# Patient Record
Sex: FEMALE | Race: WHITE | NOT HISPANIC OR LATINO | Employment: FULL TIME | URBAN - METROPOLITAN AREA
[De-identification: names, ages, dates, MRNs, and addresses within clinical notes are randomized per-mention and may not be internally consistent; named-entity substitution may affect disease eponyms.]

---

## 2017-01-05 ENCOUNTER — OFFICE VISIT (OUTPATIENT)
Dept: FAMILY MEDICINE CLINIC | Facility: CLINIC | Age: 52
End: 2017-01-05

## 2017-01-05 VITALS
BODY MASS INDEX: 23.03 KG/M2 | HEIGHT: 72 IN | RESPIRATION RATE: 16 BRPM | WEIGHT: 170 LBS | SYSTOLIC BLOOD PRESSURE: 106 MMHG | HEART RATE: 80 BPM | DIASTOLIC BLOOD PRESSURE: 80 MMHG

## 2017-01-05 DIAGNOSIS — M77.8 LEFT ELBOW TENDONITIS: Primary | ICD-10-CM

## 2017-01-05 DIAGNOSIS — M79.671 PAIN OF RIGHT HEEL: ICD-10-CM

## 2017-01-05 PROBLEM — I10 ESSENTIAL HYPERTENSION: Status: ACTIVE | Noted: 2017-01-05

## 2017-01-05 PROCEDURE — 73070 X-RAY EXAM OF ELBOW: CPT | Performed by: FAMILY MEDICINE

## 2017-01-05 PROCEDURE — 99214 OFFICE O/P EST MOD 30 MIN: CPT | Performed by: FAMILY MEDICINE

## 2017-01-05 PROCEDURE — 73650 X-RAY EXAM OF HEEL: CPT | Performed by: FAMILY MEDICINE

## 2017-01-05 NOTE — PATIENT INSTRUCTIONS
Exercise 30 minutes most days of the week  Sleep 6-8 hours each night if possible  Low fat, low cholesterol diet   we discussed prescribed medications and how to take them   make sure you get results of any labs/studies ordered today  Low glycemic index diet plantar stretching exercises,    Vivlodex 5mg q day  Sample to try  #3       Tennis elbow strap left elbow

## 2017-01-05 NOTE — MR AVS SNAPSHOT
Adriana Lynrien   2017 12:00 PM   Office Visit    Provider:  Isiah Pérez MD   Department:  [facility]   Dept Phone:  [phone]                Your Full Care Plan              Your Updated Medication List          This list is accurate as of: 17  5:24 PM.  Always use your most recent med list.                amLODIPine 5 MG tablet   Commonly known as:  NORVASC   TAKE ONE TABLET BY MOUTH ONCE DAILY               We Performed the Following     XR Calcaneus 2+ View Right (In Office)     XR Elbow 2 View Left (In Office)       You Were Diagnosed With        Codes Comments    Left elbow tendonitis    -  Primary ICD-10-CM: M77.8  ICD-9-CM: 727.09     Pain of right heel     ICD-10-CM: M79.671  ICD-9-CM: 729.5       Instructions    Exercise 30 minutes most days of the week  Sleep 6-8 hours each night if possible  Low fat, low cholesterol diet   we discussed prescribed medications and how to take them   make sure you get results of any labs/studies ordered today  Low glycemic index diet plantar stretching exercises,    Vivlodex 5mg q day  Sample to try  #3       Tennis elbow strap left elbow     Patient Instructions History      [facility] Signup     [facility] [employer] allows you to send messages to your doctor, view your test results, renew your prescriptions, schedule appointments, and more. To sign up, go to [employer] and click on the Sign Up Now link in the New User? box. Enter your [employer] Activation Code exactly as it appears below along with the last four digits of your Social Security Number and your Date of Birth () to complete the sign-up process. If you do not sign up before the expiration date, you must request a new code.    [employer] Activation Code: [email or web address]  Expires: 2017  5:24 PM    If you have questions, you can email [email or web address] or call [phone] to talk to our [employer] staff. Remember,  "MyChart is NOT to be used for urgent needs. For medical emergencies, dial 911.               Other Info from Your Visit           Allergies     Compazine [Prochlorperazine Edisylate] Unspecified       Reason for Visit     Elbow Pain     Foot Pain           Vital Signs     Blood Pressure Pulse Respirations Height Weight Body Mass Index    106/80 80 16 79\" (200.7 cm) 170 lb (77.1 kg) 19.15 kg/m2    Smoking Status                   Never Smoker           Problems and Diagnoses Noted     High blood pressure    Left elbow tendonitis    -  Primary    Pain of right heel          "

## 2017-01-05 NOTE — PROGRESS NOTES
Subjective   Adriana Mcginnis is a 51 y.o. female.     History of Present Illness   Chief Complaint:   Chief Complaint   Patient presents with   • Elbow Pain   • Foot Pain       Adriana Mcginnis 51 y.o. female who presents today for left elbow pain for 6 mos. She has been using a tennis elbow strap and it is not helping. She is also having right heel for 4 mos.   she has a history of   No trauma. From elbow and no swelling. Probable plantar fascitis right heel There is no problem list on file for this patient.  .  Since the last visit, she has overall felt well.  she has been compliant with   Current Outpatient Prescriptions:   •  amLODIPine (NORVASC) 5 MG tablet, TAKE ONE TABLET BY MOUTH ONCE DAILY, Disp: 90 tablet, Rfl: 3.  she denies medication side effects.    All of the chronic condition(s) listed above are stable w/o issues.    There were no vitals taken for this visit.        The following portions of the patient's history were reviewed and updated as appropriate: allergies, current medications, past family history, past medical history, past social history, past surgical history and problem list.    Review of Systems   Constitutional: Negative for activity change, appetite change and unexpected weight change.   HENT: Negative for nosebleeds and trouble swallowing.    Eyes: Negative for pain and visual disturbance.   Respiratory: Negative for chest tightness, shortness of breath and wheezing.    Cardiovascular: Negative for chest pain and palpitations.   Gastrointestinal: Negative for abdominal pain and blood in stool.   Endocrine: Negative.    Genitourinary: Negative for difficulty urinating and hematuria.   Musculoskeletal: Negative for joint swelling.        Elbow pain  Heel pain   Skin: Negative for color change and rash.   Allergic/Immunologic: Negative.    Neurological: Negative for syncope and speech difficulty.   Hematological: Negative for adenopathy.   Psychiatric/Behavioral: Negative for agitation  and confusion.   All other systems reviewed and are negative.      Objective   Physical Exam   Constitutional: She is oriented to person, place, and time. She appears well-developed and well-nourished.   HENT:   Head: Normocephalic.   Mouth/Throat: Oropharynx is clear and moist.   Eyes: Pupils are equal, round, and reactive to light.   Neck: Normal range of motion. Neck supple. No thyromegaly present.   Cardiovascular: Normal rate and regular rhythm.    Pulmonary/Chest: Effort normal and breath sounds normal.   Abdominal: Soft.   Musculoskeletal: Normal range of motion.   Tennis elbow left      Tender right heel  Pain on walking    Plantar fascitis  Right heel   Neurological: She is alert and oriented to person, place, and time.   Skin: No rash noted.   Psychiatric: She has a normal mood and affect. Her behavior is normal.   Nursing note and vitals reviewed.  Views: PA/Lateral  Views: PA/Lateral    Relevant Clinical Issues/Diagnoses/Indications for XRay: see HPI  Clinical Findings: Extremities: Foot: Negative    Compared with previous XRay? not applicable    Date of Previous Xrana    Changes on current Xray? no  Relevant Clinical Issues/Diagnoses/Indications for XRay: see HPI  Clinical Findings: Extremities: Elbow: neg    Compared with previous XRay? no    Date of Previous Xray:n/a  Changes on current Xray? no    Assessment/Plan   Adriana was seen today for elbow pain and foot pain.    Diagnoses and all orders for this visit:    Left elbow tendonitis  -     XR Elbow 2 View Left (In Office)    Pain of right heel  -     XR Calcaneus 2+ View Right (In Office)

## 2017-01-26 ENCOUNTER — OFFICE VISIT (OUTPATIENT)
Dept: FAMILY MEDICINE CLINIC | Facility: CLINIC | Age: 52
End: 2017-01-26

## 2017-01-26 VITALS
WEIGHT: 170 LBS | BODY MASS INDEX: 23.03 KG/M2 | HEART RATE: 70 BPM | HEIGHT: 72 IN | TEMPERATURE: 98.6 F | RESPIRATION RATE: 16 BRPM | DIASTOLIC BLOOD PRESSURE: 72 MMHG | SYSTOLIC BLOOD PRESSURE: 110 MMHG

## 2017-01-26 DIAGNOSIS — E21.3 HYPERPARATHYROIDISM (HCC): ICD-10-CM

## 2017-01-26 DIAGNOSIS — Z12.11 ENCOUNTER FOR SCREENING COLONOSCOPY: ICD-10-CM

## 2017-01-26 DIAGNOSIS — R79.89 ELEVATED LFTS: Primary | ICD-10-CM

## 2017-01-26 DIAGNOSIS — I10 ESSENTIAL HYPERTENSION: ICD-10-CM

## 2017-01-26 DIAGNOSIS — M85.80 OSTEOPENIA: ICD-10-CM

## 2017-01-26 PROCEDURE — 77080 DXA BONE DENSITY AXIAL: CPT | Performed by: FAMILY MEDICINE

## 2017-01-26 PROCEDURE — 99214 OFFICE O/P EST MOD 30 MIN: CPT | Performed by: FAMILY MEDICINE

## 2017-01-26 RX ORDER — ALENDRONATE SODIUM 70 MG/1
70 TABLET ORAL
Qty: 12 TABLET | Refills: 3
Start: 2017-01-26 | End: 2021-12-08 | Stop reason: SDDI

## 2017-01-26 NOTE — PROGRESS NOTES
Subjective   Adriana Mcginnis is a 51 y.o. female.     History of Present Illness   Chief Complaint:   Chief Complaint   Patient presents with   • Elevated liver Function       Adriana Mcginnis 51 y.o. female who presents today for elevated livedr function tests. I discussed this with Dr. Foreman and he stated that she should have an US of her liver and a possible referral to Dr. Amaya. .  she has a history of   Patient Active Problem List   Diagnosis   • Essential hypertension   .  Since the last visit, she has overall felt well.  she has been compliant with   Current Outpatient Prescriptions:   •  amLODIPine (NORVASC) 5 MG tablet, TAKE ONE TABLET BY MOUTH ONCE DAILY, Disp: 90 tablet, Rfl: 3.  she denies medication side effects.    All of the chronic condition(s) listed above are stable w/o issues.    There were no vitals taken for this visit.    Results for orders placed or performed in visit on 12/02/16   Hepatic Function Panel   Result Value Ref Range    Total Protein 7.1 6.0 - 8.5 g/dL    Albumin 4.70 3.50 - 5.20 g/dL    Total Bilirubin 0.6 0.1 - 1.2 mg/dL    Bilirubin, Direct <0.2 0.0 - 0.3 mg/dL    Alkaline Phosphatase 69 39 - 117 U/L    AST (SGOT) 42 (H) 1 - 32 U/L    ALT (SGPT) 60 (H) 1 - 33 U/L         The following portions of the patient's history were reviewed and updated as appropriate: allergies, current medications, past family history, past medical history, past social history, past surgical history and problem list.    Review of Systems   Constitutional: Negative for activity change, appetite change and unexpected weight change.   Eyes: Negative for visual disturbance.   Respiratory: Negative for chest tightness and shortness of breath.    Cardiovascular: Negative for chest pain and palpitations.   Neurological: Negative for syncope and speech difficulty.   Psychiatric/Behavioral: Negative for confusion and decreased concentration.       Objective   Physical Exam   Constitutional: She is oriented  to person, place, and time. She appears well-developed and well-nourished.   HENT:   Head: Normocephalic and atraumatic.   Eyes: EOM are normal. Pupils are equal, round, and reactive to light.   Neck: Normal range of motion. Neck supple.   Cardiovascular: Normal rate, regular rhythm, normal heart sounds and intact distal pulses.    Pulmonary/Chest: Effort normal.   Abdominal: Soft.   Neurological: She is alert and oriented to person, place, and time.   Skin: Skin is warm and dry.   Nursing note and vitals reviewed.      Assessment/Plan   Adriana was seen today for elevated liver function.    Diagnoses and all orders for this visit:    Elevated LFTs  -     US Liver; Future  -     Ambulatory Referral to Gastroenterology    Osteopenia  -     DEXA Bone Density Axial    Hyperparathyroidism  -     DEXA Bone Density Axial    Essential hypertension    Encounter for screening colonoscopy  -     Ambulatory Referral to Gastroenterology    Other orders  -     alendronate (FOSAMAX) 70 MG tablet; Take 1 tablet by mouth Every 7 (Seven) Days.

## 2017-01-26 NOTE — PATIENT INSTRUCTIONS
Exercise 30 minutes most days of the week  Sleep 6-8 hours each night if possible  Low fat, low cholesterol diet   we discussed prescribed medications and how to take them   make sure you get results of any labs/studies ordered today  Low glycemic index diet  FAX BONE DENS TO DR GUILLORY

## 2017-01-26 NOTE — MR AVS SNAPSHOT
Adriana Rahel   1/26/2017 11:45 AM   Office Visit    Provider:  Isiah Pérez MD   Department:  [facility]   Dept Phone:  [phone]                Your Full Care Plan              Today's Medication Changes          These changes are accurate as of: 1/26/17  5:25 PM.  If you have any questions, ask your nurse or doctor.               New Medication(s)Ordered:     alendronate 70 MG tablet   Commonly known as:  FOSAMAX   Take 1 tablet by mouth Every 7 (Seven) Days.            Where to Get Your Medications      Information about where to get these medications is not yet available     ! Ask your nurse or doctor about these medications     alendronate 70 MG tablet                  Your Updated Medication List          This list is accurate as of: 1/26/17  5:25 PM.  Always use your most recent med list.                alendronate 70 MG tablet   Commonly known as:  FOSAMAX   Take 1 tablet by mouth Every 7 (Seven) Days.       amLODIPine 5 MG tablet   Commonly known as:  NORVASC   TAKE ONE TABLET BY MOUTH ONCE DAILY               We Performed the Following     Ambulatory Referral to Gastroenterology     DEXA Bone Density Axial       You Were Diagnosed With        Codes Comments    Elevated LFTs    -  Primary ICD-10-CM: R79.89  ICD-9-CM: 790.6     Osteopenia     ICD-10-CM: M85.80  ICD-9-CM: 733.90     Hyperparathyroidism     ICD-10-CM: E21.3  ICD-9-CM: 252.00     Essential hypertension     ICD-10-CM: I10  ICD-9-CM: 401.9     Encounter for screening colonoscopy     ICD-10-CM: Z12.11  ICD-9-CM: V76.51       Instructions    Exercise 30 minutes most days of the week  Sleep 6-8 hours each night if possible  Low fat, low cholesterol diet   we discussed prescribed medications and how to take them   make sure you get results of any labs/studies ordered today  Low glycemic index diet  FAX BONE DENS TO DR GUILLORY         Patient Instructions History      MyChart Signup     "   [employer] allows you to send messages to your doctor, view your test results, renew your prescriptions, schedule appointments, and more. To sign up, go to [employer] and click on the Sign Up Now link in the New User? box. Enter your Reissued Activation Code exactly as it appears below along with the last four digits of your Social Security Number and your Date of Birth () to complete the sign-up process. If you do not sign up before the expiration date, you must request a new code.    Reissued Activation Code: [email or web address]  Expires: 2017  5:25 PM    If you have questions, you can email [email or web address] or call [phone] to talk to our Reissued staff. Remember, Reissued is NOT to be used for urgent needs. For medical emergencies, dial 911.               Other Info from Your Visit           Allergies     Compazine [Prochlorperazine Edisylate] Unspecified       Reason for Visit     Elevated liver Function           Vital Signs     Blood Pressure Pulse Temperature Respirations Height Weight    110/72 70 98.6 °F (37 °C) 16 79\" (200.7 cm) 170 lb (77.1 kg)    Body Mass Index Smoking Status                19.15 kg/m2 Never Smoker          Problems and Diagnoses Noted     High blood pressure    Elevated LFTs    -  Primary    Bone disease        Parathyroid hormone excess        Screen for colon cancer          Results     DEXA Bone Density Axial             "

## 2017-02-13 ENCOUNTER — HOSPITAL ENCOUNTER (OUTPATIENT)
Dept: ULTRASOUND IMAGING | Facility: HOSPITAL | Age: 52
Discharge: HOME | End: 2017-02-13
Admitting: FAMILY MEDICINE

## 2017-02-13 DIAGNOSIS — R79.89 ELEVATED LFTS: ICD-10-CM

## 2017-02-13 PROCEDURE — 76705 ECHO EXAM OF ABDOMEN: CPT

## 2017-03-22 ENCOUNTER — OFFICE VISIT (OUTPATIENT)
Dept: GASTROENTEROLOGY | Facility: CLINIC | Age: 52
End: 2017-03-22

## 2017-03-22 VITALS
HEIGHT: 70 IN | DIASTOLIC BLOOD PRESSURE: 82 MMHG | SYSTOLIC BLOOD PRESSURE: 110 MMHG | WEIGHT: 187.4 LBS | BODY MASS INDEX: 26.83 KG/M2

## 2017-03-22 DIAGNOSIS — R79.89 ELEVATED LFTS: Primary | ICD-10-CM

## 2017-03-22 DIAGNOSIS — Z12.11 ENCOUNTER FOR SCREENING FOR MALIGNANT NEOPLASM OF COLON: ICD-10-CM

## 2017-03-22 PROCEDURE — 99203 OFFICE O/P NEW LOW 30 MIN: CPT | Performed by: INTERNAL MEDICINE

## 2017-03-22 NOTE — PROGRESS NOTES
Chief Complaint   Patient presents with   • Elevated Hepatic Enzymes       Adriana Mcginnis is 50 yo for initial patient visit for elevated transaminases.  She has no personal history of liver disease.  These laboratory abnormalities were found incidentally during routine lab work done because she is on stelara.  These are not thought to be related related to stelara.  She's taking still there for psoriasis and has had good results.  She has no family history of liver disease.  She has no history of alcohol abuse.  She takes no over-the-counter medications or herbal supplements.  She denies any history of jaundice.  She has no prior history of IV needlestick or blood transfusion.  She has no family history of colon rectal cancer or polyps she's never had a screen colonoscopy.  She is no issues with her bowel habits or blood in her stool.    HPI  Past Medical History:   Diagnosis Date   • Hypertension    • Parathyroid adenoma     benign   • Psoriasis    • Thyroid nodule      Past Surgical History:   Procedure Laterality Date   • CARPAL TUNNEL RELEASE     •  SECTION     • PARATHYROIDECTOMY         Current Outpatient Prescriptions:   •  alendronate (FOSAMAX) 70 MG tablet, Take 1 tablet by mouth Every 7 (Seven) Days., Disp: 12 tablet, Rfl: 3  •  amLODIPine (NORVASC) 5 MG tablet, TAKE ONE TABLET BY MOUTH ONCE DAILY, Disp: 90 tablet, Rfl: 3  •  Ustekinumab (STELARA SC), Inject  under the skin., Disp: , Rfl:   PRN Meds:.  Allergies   Allergen Reactions   • Compazine [Prochlorperazine Edisylate]      Social History     Social History   • Marital status:      Spouse name: N/A   • Number of children: N/A   • Years of education: N/A     Occupational History   • Not on file.     Social History Main Topics   • Smoking status: Never Smoker   • Smokeless tobacco: Never Used   • Alcohol use Yes      Comment: occ   • Drug use: No   • Sexual activity: Defer     Other Topics Concern   • Not on file     Social History  Narrative     Family History   Problem Relation Age of Onset   • Hypertension Mother    • Heart disease Father    • Diabetes Father    • Thyroid cancer Brother      Review of Systems-no abd pain, psoriasis controlled with stelara  Vitals:    03/22/17 1308   BP: 110/82     Last Weight    03/22/17  1308   Weight: 187 lb 6.4 oz (85 kg)      Physical Exam   Constitutional: She appears well-developed and well-nourished.   HENT:   Head: Normocephalic and atraumatic.   Eyes: No scleral icterus.   Abdominal: Soft. Bowel sounds are normal. She exhibits no distension. There is no tenderness.   Neurological: She is alert.   Skin: Skin is warm and dry.   Psychiatric: She has a normal mood and affect.     1. Elevated lfts  2. Screening for CRC    PLan:  - serologic w/u for elevated LFTs.  Her liver enzymes are only mildly elevated this time.  We will continue to follow and if they're persistent or worsening we do not find any obvious explanation on her lab work we may consider liver biopsy down the road but for now they're very mild.  - We'll schedule screening colonoscopy at her convenience.

## 2017-03-23 LAB
A1AT SERPL-MCNC: 145 MG/DL (ref 90–200)
ACTIN IGG SERPL-ACNC: 15 UNITS (ref 0–19)
ALBUMIN SERPL-MCNC: 4.6 G/DL (ref 3.5–5.2)
ALBUMIN/GLOB SERPL: 1.9 G/DL
ALP SERPL-CCNC: 65 U/L (ref 39–117)
ALT SERPL-CCNC: 51 U/L (ref 1–33)
ANA SER QL: NEGATIVE
AST SERPL-CCNC: 41 U/L (ref 1–32)
BILIRUB SERPL-MCNC: 0.6 MG/DL (ref 0.1–1.2)
BUN SERPL-MCNC: 11 MG/DL (ref 6–20)
BUN/CREAT SERPL: 13.4 (ref 7–25)
CALCIUM SERPL-MCNC: 10 MG/DL (ref 8.6–10.5)
CERULOPLASMIN SERPL-MCNC: 28.8 MG/DL (ref 19–39)
CHLORIDE SERPL-SCNC: 99 MMOL/L (ref 98–107)
CO2 SERPL-SCNC: 27.1 MMOL/L (ref 22–29)
CREAT SERPL-MCNC: 0.82 MG/DL (ref 0.57–1)
FERRITIN SERPL-MCNC: 42.83 NG/ML (ref 13–150)
GLOBULIN SER CALC-MCNC: 2.4 GM/DL
GLUCOSE SERPL-MCNC: 80 MG/DL (ref 65–99)
HAV AB SER QL IA: NEGATIVE
HAV IGM SERPL QL IA: NEGATIVE
HBV CORE IGM SERPL QL IA: NEGATIVE
HBV SURFACE AG SERPL QL IA: NEGATIVE
HCV AB S/CO SERPL IA: <0.1 S/CO RATIO (ref 0–0.9)
IGG SERPL-MCNC: 811 MG/DL (ref 700–1600)
IRON SATN MFR SERPL: 25 % (ref 20–50)
IRON SERPL-MCNC: 97 MCG/DL (ref 37–145)
MITOCHONDRIA M2 IGG SER-ACNC: <20 UNITS (ref 0–20)
POTASSIUM SERPL-SCNC: 4.3 MMOL/L (ref 3.5–5.2)
PROT SERPL-MCNC: 7 G/DL (ref 6–8.5)
SODIUM SERPL-SCNC: 141 MMOL/L (ref 136–145)
TIBC SERPL-MCNC: 382 MCG/DL
UIBC SERPL-MCNC: 285 MCG/DL

## 2017-03-30 ENCOUNTER — TELEPHONE (OUTPATIENT)
Dept: GASTROENTEROLOGY | Facility: CLINIC | Age: 52
End: 2017-03-30

## 2017-03-30 NOTE — TELEPHONE ENCOUNTER
----- Message from Lillian Leal MD sent at 3/28/2017  5:25 PM EDT -----  LFT elevations are stable and all other blood work that I checked was normal.  Given that her abnormalities are mild I would recommend rechecking her LFTs in 3 months and just following it for now

## 2017-03-30 NOTE — TELEPHONE ENCOUNTER
Call to pt.  Advise per Dr Leal that LFT elevations are stable, and all other blood work was normal.  Given that her abnormalities are mild, Dr Leal recommends rechecking her LFTs in 3 mo's, and just following it for now.    Pt verb understanding.  Lab appt scheduled for 6/7/17 @ 0800.

## 2017-04-02 ENCOUNTER — RESULTS ENCOUNTER (OUTPATIENT)
Dept: GASTROENTEROLOGY | Facility: CLINIC | Age: 52
End: 2017-04-02

## 2017-04-02 DIAGNOSIS — R79.89 ELEVATED LFTS: ICD-10-CM

## 2017-05-16 ENCOUNTER — ANESTHESIA EVENT (OUTPATIENT)
Dept: GASTROENTEROLOGY | Facility: HOSPITAL | Age: 52
End: 2017-05-16

## 2017-05-16 ENCOUNTER — HOSPITAL ENCOUNTER (OUTPATIENT)
Facility: HOSPITAL | Age: 52
Setting detail: HOSPITAL OUTPATIENT SURGERY
Discharge: HOME | End: 2017-05-16
Attending: INTERNAL MEDICINE | Admitting: INTERNAL MEDICINE

## 2017-05-16 ENCOUNTER — ANESTHESIA (OUTPATIENT)
Dept: GASTROENTEROLOGY | Facility: HOSPITAL | Age: 52
End: 2017-05-16

## 2017-05-16 VITALS
BODY MASS INDEX: 24.34 KG/M2 | HEIGHT: 70 IN | HEART RATE: 66 BPM | WEIGHT: 170 LBS | OXYGEN SATURATION: 100 % | SYSTOLIC BLOOD PRESSURE: 117 MMHG | RESPIRATION RATE: 16 BRPM | DIASTOLIC BLOOD PRESSURE: 85 MMHG | TEMPERATURE: 97.8 F

## 2017-05-16 DIAGNOSIS — Z12.11 ENCOUNTER FOR SCREENING FOR MALIGNANT NEOPLASM OF COLON: ICD-10-CM

## 2017-05-16 LAB
B-HCG UR QL: NEGATIVE
INTERNAL NEGATIVE CONTROL: NEGATIVE
INTERNAL POSITIVE CONTROL: POSITIVE
Lab: NORMAL

## 2017-05-16 PROCEDURE — 88305 TISSUE EXAM BY PATHOLOGIST: CPT | Performed by: INTERNAL MEDICINE

## 2017-05-16 PROCEDURE — S0260 H&P FOR SURGERY: HCPCS | Performed by: INTERNAL MEDICINE

## 2017-05-16 PROCEDURE — 45380 COLONOSCOPY AND BIOPSY: CPT | Performed by: INTERNAL MEDICINE

## 2017-05-16 PROCEDURE — 25010000002 PROPOFOL 10 MG/ML EMULSION: Performed by: ANESTHESIOLOGY

## 2017-05-16 RX ORDER — PROPOFOL 10 MG/ML
VIAL (ML) INTRAVENOUS AS NEEDED
Status: DISCONTINUED | OUTPATIENT
Start: 2017-05-16 | End: 2017-05-16 | Stop reason: SURG

## 2017-05-16 RX ORDER — SODIUM CHLORIDE, SODIUM LACTATE, POTASSIUM CHLORIDE, CALCIUM CHLORIDE 600; 310; 30; 20 MG/100ML; MG/100ML; MG/100ML; MG/100ML
30 INJECTION, SOLUTION INTRAVENOUS CONTINUOUS PRN
Status: DISCONTINUED | OUTPATIENT
Start: 2017-05-16 | End: 2017-05-16 | Stop reason: HOSPADM

## 2017-05-16 RX ORDER — PROPOFOL 10 MG/ML
VIAL (ML) INTRAVENOUS CONTINUOUS PRN
Status: DISCONTINUED | OUTPATIENT
Start: 2017-05-16 | End: 2017-05-16 | Stop reason: SURG

## 2017-05-16 RX ORDER — LIDOCAINE HYDROCHLORIDE 20 MG/ML
INJECTION, SOLUTION INFILTRATION; PERINEURAL AS NEEDED
Status: DISCONTINUED | OUTPATIENT
Start: 2017-05-16 | End: 2017-05-16 | Stop reason: SURG

## 2017-05-16 RX ADMIN — PROPOFOL 140 MCG/KG/MIN: 10 INJECTION, EMULSION INTRAVENOUS at 11:02

## 2017-05-16 RX ADMIN — SODIUM CHLORIDE, POTASSIUM CHLORIDE, SODIUM LACTATE AND CALCIUM CHLORIDE 30 ML/HR: 600; 310; 30; 20 INJECTION, SOLUTION INTRAVENOUS at 10:29

## 2017-05-16 RX ADMIN — LIDOCAINE HYDROCHLORIDE 50 MG: 20 INJECTION, SOLUTION INFILTRATION; PERINEURAL at 11:02

## 2017-05-16 RX ADMIN — PROPOFOL 150 MG: 10 INJECTION, EMULSION INTRAVENOUS at 11:02

## 2017-05-17 LAB
CYTO UR: NORMAL
LAB AP CASE REPORT: NORMAL
Lab: NORMAL
PATH REPORT.FINAL DX SPEC: NORMAL
PATH REPORT.GROSS SPEC: NORMAL

## 2017-05-18 ENCOUNTER — TELEPHONE (OUTPATIENT)
Dept: GASTROENTEROLOGY | Facility: CLINIC | Age: 52
End: 2017-05-18

## 2017-05-26 ENCOUNTER — CLINICAL SUPPORT (OUTPATIENT)
Dept: FAMILY MEDICINE CLINIC | Facility: CLINIC | Age: 52
End: 2017-05-26

## 2017-05-26 DIAGNOSIS — Z23 IMMUNIZATION DUE: Primary | ICD-10-CM

## 2017-05-26 PROCEDURE — 90471 IMMUNIZATION ADMIN: CPT | Performed by: FAMILY MEDICINE

## 2017-05-26 PROCEDURE — 90715 TDAP VACCINE 7 YRS/> IM: CPT | Performed by: FAMILY MEDICINE

## 2017-06-07 LAB
ALBUMIN SERPL-MCNC: 4.1 G/DL (ref 3.5–5.2)
ALBUMIN/GLOB SERPL: 1.6 G/DL
ALP SERPL-CCNC: 55 U/L (ref 39–117)
ALT SERPL-CCNC: 43 U/L (ref 1–33)
AST SERPL-CCNC: 36 U/L (ref 1–32)
BILIRUB SERPL-MCNC: 0.6 MG/DL (ref 0.1–1.2)
BUN SERPL-MCNC: 13 MG/DL (ref 6–20)
BUN/CREAT SERPL: 14.6 (ref 7–25)
CALCIUM SERPL-MCNC: 10.1 MG/DL (ref 8.6–10.5)
CHLORIDE SERPL-SCNC: 104 MMOL/L (ref 98–107)
CO2 SERPL-SCNC: 26.4 MMOL/L (ref 22–29)
CREAT SERPL-MCNC: 0.89 MG/DL (ref 0.57–1)
GLOBULIN SER CALC-MCNC: 2.5 GM/DL
GLUCOSE SERPL-MCNC: 79 MG/DL (ref 65–99)
POTASSIUM SERPL-SCNC: 4.1 MMOL/L (ref 3.5–5.2)
PROT SERPL-MCNC: 6.6 G/DL (ref 6–8.5)
SODIUM SERPL-SCNC: 143 MMOL/L (ref 136–145)

## 2017-06-14 ENCOUNTER — TELEPHONE (OUTPATIENT)
Dept: GASTROENTEROLOGY | Facility: CLINIC | Age: 52
End: 2017-06-14

## 2017-06-14 NOTE — TELEPHONE ENCOUNTER
----- Message from Lillian Leal MD sent at 6/9/2017  2:29 PM EDT -----  Stable elevation in transaminases (a little better overall ) - recommend repeat labs in 6 months (CMP)

## 2017-06-19 NOTE — TELEPHONE ENCOUNTER
Call to pt.  Advise per Dr Leal that stable elevation in transaminases ( a little better overall) recommend repeat labs in 6 mo's (CMP).    Pt verb understanding and states WCB to make lab appt.

## 2017-11-21 ENCOUNTER — FLU SHOT (OUTPATIENT)
Dept: FAMILY MEDICINE CLINIC | Facility: CLINIC | Age: 52
End: 2017-11-21

## 2017-11-21 PROCEDURE — 90471 IMMUNIZATION ADMIN: CPT | Performed by: FAMILY MEDICINE

## 2017-11-21 PROCEDURE — 90686 IIV4 VACC NO PRSV 0.5 ML IM: CPT | Performed by: FAMILY MEDICINE

## 2018-03-07 RX ORDER — AMLODIPINE BESYLATE 5 MG/1
TABLET ORAL
Qty: 90 TABLET | Refills: 1 | OUTPATIENT
Start: 2018-03-07

## 2018-03-09 RX ORDER — AMLODIPINE BESYLATE 5 MG/1
TABLET ORAL
Qty: 90 TABLET | Refills: 1 | Status: SHIPPED | OUTPATIENT
Start: 2018-03-09 | End: 2018-09-13 | Stop reason: SINTOL

## 2018-03-12 RX ORDER — AMLODIPINE BESYLATE 5 MG/1
TABLET ORAL
Qty: 90 TABLET | Refills: 1 | OUTPATIENT
Start: 2018-03-12

## 2018-04-18 ENCOUNTER — CLINICAL SUPPORT (OUTPATIENT)
Dept: FAMILY MEDICINE CLINIC | Facility: CLINIC | Age: 53
End: 2018-04-18

## 2018-04-18 DIAGNOSIS — Z23 IMMUNIZATION DUE: Primary | ICD-10-CM

## 2018-04-18 PROCEDURE — 90471 IMMUNIZATION ADMIN: CPT | Performed by: FAMILY MEDICINE

## 2018-04-18 PROCEDURE — 90632 HEPA VACCINE ADULT IM: CPT | Performed by: FAMILY MEDICINE

## 2018-08-04 DIAGNOSIS — I10 ESSENTIAL HYPERTENSION: Primary | ICD-10-CM

## 2018-08-04 DIAGNOSIS — T50.905D ADVERSE EFFECT OF DRUG, SUBSEQUENT ENCOUNTER: ICD-10-CM

## 2018-08-04 DIAGNOSIS — R79.89 LOW VITAMIN D LEVEL: ICD-10-CM

## 2018-08-06 LAB
25(OH)D3+25(OH)D2 SERPL-MCNC: 34.3 NG/ML (ref 30–100)
ALBUMIN SERPL-MCNC: 4.5 G/DL (ref 3.5–5.5)
ALBUMIN/GLOB SERPL: 1.8 {RATIO} (ref 1.2–2.2)
ALP SERPL-CCNC: 51 IU/L (ref 39–117)
ALT SERPL-CCNC: 39 IU/L (ref 0–32)
APPEARANCE UR: CLEAR
AST SERPL-CCNC: 40 IU/L (ref 0–40)
BACTERIA #/AREA URNS HPF: NORMAL /[HPF]
BASOPHILS # BLD AUTO: 0.1 X10E3/UL (ref 0–0.2)
BASOPHILS NFR BLD AUTO: 1 %
BILIRUB SERPL-MCNC: 0.8 MG/DL (ref 0–1.2)
BILIRUB UR QL STRIP: NEGATIVE
BUN SERPL-MCNC: 14 MG/DL (ref 6–24)
BUN/CREAT SERPL: 18 (ref 9–23)
CALCIUM SERPL-MCNC: 9.6 MG/DL (ref 8.7–10.2)
CHLORIDE SERPL-SCNC: 101 MMOL/L (ref 96–106)
CHOLEST SERPL-MCNC: 183 MG/DL (ref 100–199)
CO2 SERPL-SCNC: 26 MMOL/L (ref 20–29)
COLOR UR: YELLOW
CREAT SERPL-MCNC: 0.77 MG/DL (ref 0.57–1)
EOSINOPHIL # BLD AUTO: 0.2 X10E3/UL (ref 0–0.4)
EOSINOPHIL NFR BLD AUTO: 3 %
EPI CELLS #/AREA URNS HPF: NORMAL /HPF
ERYTHROCYTE [DISTWIDTH] IN BLOOD BY AUTOMATED COUNT: 13.6 % (ref 12.3–15.4)
GLOBULIN SER CALC-MCNC: 2.5 G/DL (ref 1.5–4.5)
GLUCOSE SERPL-MCNC: 79 MG/DL (ref 65–99)
GLUCOSE UR QL: NEGATIVE
HCT VFR BLD AUTO: 44.8 % (ref 34–46.6)
HDLC SERPL-MCNC: 54 MG/DL
HGB BLD-MCNC: 15.3 G/DL (ref 11.1–15.9)
HGB UR QL STRIP: NEGATIVE
IMM GRANULOCYTES # BLD: 0 X10E3/UL (ref 0–0.1)
IMM GRANULOCYTES NFR BLD: 0 %
KETONES UR QL STRIP: NEGATIVE
LDLC SERPL CALC-MCNC: 103 MG/DL (ref 0–99)
LEUKOCYTE ESTERASE UR QL STRIP: NEGATIVE
LYMPHOCYTES # BLD AUTO: 2.2 X10E3/UL (ref 0.7–3.1)
LYMPHOCYTES NFR BLD AUTO: 31 %
MCH RBC QN AUTO: 31.3 PG (ref 26.6–33)
MCHC RBC AUTO-ENTMCNC: 34.2 G/DL (ref 31.5–35.7)
MCV RBC AUTO: 92 FL (ref 79–97)
MICRO URNS: (no result)
MICRO URNS: (no result)
MONOCYTES # BLD AUTO: 0.5 X10E3/UL (ref 0.1–0.9)
MONOCYTES NFR BLD AUTO: 6 %
MUCOUS THREADS URNS QL MICRO: PRESENT
NEUTROPHILS # BLD AUTO: 4.3 X10E3/UL (ref 1.4–7)
NEUTROPHILS NFR BLD AUTO: 59 %
NITRITE UR QL STRIP: NEGATIVE
PH UR STRIP: 6.5 [PH] (ref 5–7.5)
PLATELET # BLD AUTO: 219 X10E3/UL (ref 150–379)
POTASSIUM SERPL-SCNC: 4 MMOL/L (ref 3.5–5.2)
PROT SERPL-MCNC: 7 G/DL (ref 6–8.5)
PROT UR QL STRIP: NEGATIVE
RBC # BLD AUTO: 4.89 X10E6/UL (ref 3.77–5.28)
RBC #/AREA URNS HPF: NORMAL /HPF
SODIUM SERPL-SCNC: 142 MMOL/L (ref 134–144)
SP GR UR: 1.01 (ref 1–1.03)
TRIGL SERPL-MCNC: 130 MG/DL (ref 0–149)
TSH SERPL DL<=0.005 MIU/L-ACNC: 1.71 UIU/ML (ref 0.45–4.5)
UROBILINOGEN UR STRIP-MCNC: 0.2 MG/DL (ref 0.2–1)
VLDLC SERPL CALC-MCNC: 26 MG/DL (ref 5–40)
WBC # BLD AUTO: 7.2 X10E3/UL (ref 3.4–10.8)
WBC #/AREA URNS HPF: NORMAL /HPF

## 2018-08-27 RX ORDER — CLOBETASOL PROPIONATE 0.46 MG/ML
SOLUTION TOPICAL
Qty: 1 EACH | Refills: 1 | Status: SHIPPED | OUTPATIENT
Start: 2018-08-27

## 2018-09-13 ENCOUNTER — OFFICE VISIT (OUTPATIENT)
Dept: FAMILY MEDICINE CLINIC | Facility: CLINIC | Age: 53
End: 2018-09-13

## 2018-09-13 VITALS
OXYGEN SATURATION: 97 % | HEIGHT: 70 IN | DIASTOLIC BLOOD PRESSURE: 86 MMHG | TEMPERATURE: 97.9 F | SYSTOLIC BLOOD PRESSURE: 132 MMHG | HEART RATE: 60 BPM | WEIGHT: 172 LBS | BODY MASS INDEX: 24.62 KG/M2 | RESPIRATION RATE: 16 BRPM

## 2018-09-13 DIAGNOSIS — R05.8 COUGH SECONDARY TO ANGIOTENSIN CONVERTING ENZYME INHIBITOR (ACE-I): ICD-10-CM

## 2018-09-13 DIAGNOSIS — Z00.00 ANNUAL PHYSICAL EXAM: Primary | ICD-10-CM

## 2018-09-13 DIAGNOSIS — I10 ESSENTIAL HYPERTENSION: ICD-10-CM

## 2018-09-13 DIAGNOSIS — L40.9 PSORIASIS: ICD-10-CM

## 2018-09-13 DIAGNOSIS — D35.1 PARATHYROID ADENOMA: ICD-10-CM

## 2018-09-13 DIAGNOSIS — T46.4X5A COUGH SECONDARY TO ANGIOTENSIN CONVERTING ENZYME INHIBITOR (ACE-I): ICD-10-CM

## 2018-09-13 PROCEDURE — 93000 ELECTROCARDIOGRAM COMPLETE: CPT | Performed by: FAMILY MEDICINE

## 2018-09-13 PROCEDURE — 71046 X-RAY EXAM CHEST 2 VIEWS: CPT | Performed by: FAMILY MEDICINE

## 2018-09-13 PROCEDURE — 99396 PREV VISIT EST AGE 40-64: CPT | Performed by: FAMILY MEDICINE

## 2018-09-13 RX ORDER — LOSARTAN POTASSIUM 25 MG/1
25 TABLET ORAL DAILY
Qty: 30 TABLET | Refills: 0 | Status: SHIPPED | OUTPATIENT
Start: 2018-09-13 | End: 2018-10-15 | Stop reason: SDUPTHER

## 2018-09-13 NOTE — PATIENT INSTRUCTIONS
Exercise 30 minutes most days of the week  Sleep 6-8 hours each night if possible  Low fat, low cholesterol diet   we discussed prescribed medications and how to take them   make sure you get results of any labs/studies ordered today  Low glycemic index diet      will do bp check 1 week  Stop norvasc   Take cozaar 25mg starting Friday   See derm as planned

## 2018-09-13 NOTE — PROGRESS NOTES
Subjective   Chief Complaint:   Chief Complaint   Patient presents with   • Annual Exam         History of Present Illness is a 53-year-old white female who was sent by dermatology Dr. Foreman to have Norvasc discontinued and switched to another antihypertensive medication.  She  had a Ace induced cough with lisinopril.  I'm going to switch to Cozaar 25 mg 1 by mouth daily.   Labs were reviewed   CMP, CBC, lipid panel were normal.  Bone densitometer is due in January 2019 the previous one 2 years ago showed osteopenia.  Blood pressure 120/80.  She is now on prednisone 10 mg 2 daily per dermatologist.  He is also going to stop the Humira and switch to another drug.  Also was ordered some labs on her yesterday.  Her psoriasis is generalized over her  entire body.  Psoriasis is worse on feet and legs.     Adriana Mcginnis 53 y.o. female who presents today for Medical Management of the below listed issues and medication refills.  Will get her mammogram and bone densitometer in December 2018.  Is on Fosamax weekly and she uses steroid creams when necessary.  And she is on calcium and vitamin D.  On prednisone for 2 weeks per the dermatologist.       she has a problem list of   Patient Active Problem List   Diagnosis   • Essential hypertension   • Psoriasis   .  Since the last visit, she has overall felt well.  she has been compliant with   Current Outpatient Prescriptions:   •  Adalimumab (HUMIRA) 40 MG/0.8ML Pen-injector Kit, Inject 40 mg under the skin every other week as directed., Disp: 2 each, Rfl: 5  •  alendronate (FOSAMAX) 70 MG tablet, Take 1 tablet by mouth Every 7 (Seven) Days., Disp: 12 tablet, Rfl: 3  •  Calcium Citrate-Vitamin D (CALCIUM + D PO), Take 1 tablet by mouth Daily., Disp: , Rfl:   •  clobetasol (TEMOVATE) 0.05 % external solution, APPLY  SOLUTION TOPICALLY TO AFFECTED AREA ONCE DAILY AS NEEDED, Disp: 1 each, Rfl: 1  •  losartan (COZAAR) 25 MG tablet, Take 1 tablet by mouth Daily., Disp: 30  "tablet, Rfl: 0.  she denies medication side effects.    All of the chronic condition(s) listed above are stable w/o issues.    /86   Pulse 60   Temp 97.9 °F (36.6 °C)   Resp 16   Ht 177.8 cm (70\")   Wt 78 kg (172 lb)   SpO2 97%   BMI 24.68 kg/m²     Views: lateral and posterior-anterior  cxr    Relevant Clinical Issues/Diagnoses/Indications for XRay: see HPI  Clinical Findings: Chest: was negative for infiltrate, effusion, pneumothorax, or wide mediastinum    Compared with previous XRay? no    Date of Previous Xray:No previous xray available for comparison    Changes on current Xray? no    Results for orders placed or performed in visit on 08/04/18   Comprehensive metabolic panel   Result Value Ref Range    Glucose 79 65 - 99 mg/dL    BUN 14 6 - 24 mg/dL    Creatinine 0.77 0.57 - 1.00 mg/dL    eGFR Non African Am 89 >59 mL/min/1.73    eGFR African Am 103 >59 mL/min/1.73    BUN/Creatinine Ratio 18 9 - 23    Sodium 142 134 - 144 mmol/L    Potassium 4.0 3.5 - 5.2 mmol/L    Chloride 101 96 - 106 mmol/L    Total CO2 26 20 - 29 mmol/L    Calcium 9.6 8.7 - 10.2 mg/dL    Total Protein 7.0 6.0 - 8.5 g/dL    Albumin 4.5 3.5 - 5.5 g/dL    Globulin 2.5 1.5 - 4.5 g/dL    A/G Ratio 1.8 1.2 - 2.2    Total Bilirubin 0.8 0.0 - 1.2 mg/dL    Alkaline Phosphatase 51 39 - 117 IU/L    AST (SGOT) 40 0 - 40 IU/L    ALT (SGPT) 39 (H) 0 - 32 IU/L   Lipid panel   Result Value Ref Range    Total Cholesterol 183 100 - 199 mg/dL    Triglycerides 130 0 - 149 mg/dL    HDL Cholesterol 54 >39 mg/dL    VLDL Cholesterol 26 5 - 40 mg/dL    LDL Cholesterol  103 (H) 0 - 99 mg/dL   TSH   Result Value Ref Range    TSH 1.710 0.450 - 4.500 uIU/mL   Vitamin D 25 Hydroxy   Result Value Ref Range    25 Hydroxy, Vitamin D 34.3 30.0 - 100.0 ng/mL   Microscopic Examination   Result Value Ref Range    WBC, UA 0-5 0 - 5 /hpf    RBC, UA None seen 0 - 2 /hpf    Epithelial Cells (non renal) 0-10 0 - 10 /hpf    Mucus, UA Present Not Estab.    Bacteria, UA " None seen None seen/Few   CBC and Differential   Result Value Ref Range    WBC 7.2 3.4 - 10.8 x10E3/uL    RBC 4.89 3.77 - 5.28 x10E6/uL    Hemoglobin 15.3 11.1 - 15.9 g/dL    Hematocrit 44.8 34.0 - 46.6 %    MCV 92 79 - 97 fL    MCH 31.3 26.6 - 33.0 pg    MCHC 34.2 31.5 - 35.7 g/dL    RDW 13.6 12.3 - 15.4 %    Platelets 219 150 - 379 x10E3/uL    Neutrophil Rel % 59 Not Estab. %    Lymphocyte Rel % 31 Not Estab. %    Monocyte Rel % 6 Not Estab. %    Eosinophil Rel % 3 Not Estab. %    Basophil Rel % 1 Not Estab. %    Neutrophils Absolute 4.3 1.4 - 7.0 x10E3/uL    Lymphocytes Absolute 2.2 0.7 - 3.1 x10E3/uL    Monocytes Absolute 0.5 0.1 - 0.9 x10E3/uL    Eosinophils Absolute 0.2 0.0 - 0.4 x10E3/uL    Basophils Absolute 0.1 0.0 - 0.2 x10E3/uL    Immature Granulocyte Rel % 0 Not Estab. %    Immature Grans Absolute 0.0 0.0 - 0.1 x10E3/uL   Urinalysis With Microscopic - Urine, Clean Catch   Result Value Ref Range    Specific Gravity, UA 1.010 1.005 - 1.030    pH, UA 6.5 5.0 - 7.5    Color, UA Yellow Yellow    Appearance, UA Clear Clear    Leukocytes, UA Negative Negative    Protein Negative Negative/Trace    Glucose, UA Negative Negative    Ketones Negative Negative    Blood, UA Negative Negative    Bilirubin, UA Negative Negative    Urobilinogen, UA 0.2 0.2 - 1.0 mg/dL    Nitrite, UA Negative Negative    Microscopic Examination Comment     MICROSCOPIC EXAMINATION See below:              The following portions of the patient's history were reviewed and updated as appropriate: allergies, current medications, past family history, past medical history, past social history, past surgical history and problem list.    Review of Systems   Constitutional: Negative for chills, diaphoresis, fatigue, fever and unexpected weight change.   HENT: Negative for ear pain and sinus pressure.    Eyes: Negative for visual disturbance.   Respiratory: Negative for apnea, chest tightness and shortness of breath.    Cardiovascular: Negative for  chest pain.   Gastrointestinal: Negative for abdominal pain, blood in stool, nausea and vomiting.   Endocrine: Negative for polyuria.   Genitourinary: Negative for dysuria and frequency.   Musculoskeletal: Negative for arthralgias and joint swelling.   Skin: Positive for rash.   Neurological: Negative for dizziness, seizures and headaches.   Psychiatric/Behavioral: Positive for sleep disturbance. Negative for agitation and confusion. The patient is not nervous/anxious.        Objective   Physical Exam   Constitutional: She is oriented to person, place, and time. She appears well-developed and well-nourished.   HENT:   Right Ear: External ear normal.   Left Ear: External ear normal.   Mouth/Throat: Oropharynx is clear and moist.   Eyes: Pupils are equal, round, and reactive to light.   Neck: No thyromegaly present.   Cardiovascular: Normal rate, regular rhythm and normal heart sounds.    No murmur heard.  Pulmonary/Chest: Effort normal and breath sounds normal. No respiratory distress. She has no wheezes. She has no rales.   Abdominal: Soft. Bowel sounds are normal. There is no tenderness.   Musculoskeletal: She exhibits no edema, tenderness or deformity.   Lymphadenopathy:     She has no cervical adenopathy.   Neurological: She is alert and oriented to person, place, and time. She displays normal reflexes. No sensory deficit.   Skin: No rash noted.   Psychiatric: She has a normal mood and affect. Her behavior is normal. Judgment and thought content normal.   Nursing note and vitals reviewed.      Assessment/Plan   Adriana was seen today for annual exam.    Diagnoses and all orders for this visit:    Annual physical exam  -     ECG 12 Lead  -     XR Chest PA & Lateral (In Office)    Essential hypertension    Psoriasis  Comments:  rash worse    Parathyroid adenoma  Comments:  had surgery    Cough secondary to angiotensin converting enzyme inhibitor (ACE-I)    Other orders  -     losartan (COZAAR) 25 MG tablet; Take  1 tablet by mouth Daily.

## 2018-09-13 NOTE — PROGRESS NOTES
Procedure     ECG 12 Lead  Date/Time: 9/13/2018 11:27 AM  Performed by: MAGUE DELGADO  Authorized by: MAGUE DELGADO   Comparison: compared with previous ECG from 10/18/2015  Similar to previous ECG  Rhythm: sinus rhythm  Rate: normal  BPM: 71  Conduction: conduction normal  ST Segments: ST segments normal  T Waves: T waves normal  QRS axis: normal  Other: no other findings  Clinical impression: normal ECG

## 2018-10-15 RX ORDER — LOSARTAN POTASSIUM 25 MG/1
25 TABLET ORAL DAILY
Qty: 30 TABLET | Refills: 5 | Status: SHIPPED | OUTPATIENT
Start: 2018-10-15 | End: 2021-05-15

## 2018-10-19 ENCOUNTER — FLU SHOT (OUTPATIENT)
Dept: FAMILY MEDICINE CLINIC | Facility: CLINIC | Age: 53
End: 2018-10-19

## 2018-10-19 DIAGNOSIS — Z23 FLU VACCINE NEED: ICD-10-CM

## 2018-10-19 PROCEDURE — 90471 IMMUNIZATION ADMIN: CPT | Performed by: FAMILY MEDICINE

## 2018-10-19 PROCEDURE — 90674 CCIIV4 VAC NO PRSV 0.5 ML IM: CPT | Performed by: FAMILY MEDICINE

## 2018-11-21 ENCOUNTER — CLINICAL SUPPORT (OUTPATIENT)
Dept: FAMILY MEDICINE CLINIC | Facility: CLINIC | Age: 53
End: 2018-11-21

## 2018-11-21 DIAGNOSIS — Z23 IMMUNIZATION DUE: Primary | ICD-10-CM

## 2018-11-21 PROCEDURE — 90632 HEPA VACCINE ADULT IM: CPT | Performed by: FAMILY MEDICINE

## 2018-11-21 PROCEDURE — 90471 IMMUNIZATION ADMIN: CPT | Performed by: FAMILY MEDICINE

## 2018-12-18 DIAGNOSIS — M13.0 ARTHRITIS OF MULTIPLE SITES: Primary | ICD-10-CM

## 2018-12-20 ENCOUNTER — HOSPITAL ENCOUNTER (OUTPATIENT)
Dept: GENERAL RADIOLOGY | Facility: HOSPITAL | Age: 53
Discharge: HOME | End: 2018-12-20
Attending: INTERNAL MEDICINE

## 2018-12-20 ENCOUNTER — HOSPITAL ENCOUNTER (OUTPATIENT)
Dept: GENERAL RADIOLOGY | Facility: HOSPITAL | Age: 53
Discharge: HOME | End: 2018-12-20
Attending: INTERNAL MEDICINE | Admitting: INTERNAL MEDICINE

## 2018-12-20 DIAGNOSIS — M79.671 RIGHT FOOT PAIN: ICD-10-CM

## 2018-12-20 DIAGNOSIS — M54.2 CERVICALGIA: ICD-10-CM

## 2018-12-20 DIAGNOSIS — M79.641 RIGHT HAND PAIN: ICD-10-CM

## 2018-12-20 PROCEDURE — 72040 X-RAY EXAM NECK SPINE 2-3 VW: CPT

## 2018-12-20 PROCEDURE — 73630 X-RAY EXAM OF FOOT: CPT

## 2018-12-20 PROCEDURE — 73130 X-RAY EXAM OF HAND: CPT

## 2019-10-16 ENCOUNTER — FLU SHOT (OUTPATIENT)
Dept: FAMILY MEDICINE CLINIC | Facility: CLINIC | Age: 54
End: 2019-10-16

## 2019-10-16 DIAGNOSIS — Z23 FLU VACCINE NEED: ICD-10-CM

## 2019-10-16 PROCEDURE — 90471 IMMUNIZATION ADMIN: CPT | Performed by: FAMILY MEDICINE

## 2019-10-16 PROCEDURE — 90674 CCIIV4 VAC NO PRSV 0.5 ML IM: CPT | Performed by: FAMILY MEDICINE

## 2019-10-25 RX ORDER — LOSARTAN POTASSIUM 25 MG/1
TABLET ORAL
Qty: 30 TABLET | Refills: 5 | OUTPATIENT
Start: 2019-10-25

## 2020-03-06 DIAGNOSIS — R79.89 LOW VITAMIN D LEVEL: Primary | ICD-10-CM

## 2020-03-06 DIAGNOSIS — R79.89 ABNORMAL CBC: ICD-10-CM

## 2020-05-12 ENCOUNTER — OFFICE VISIT (OUTPATIENT)
Dept: FAMILY MEDICINE CLINIC | Facility: CLINIC | Age: 55
End: 2020-05-12

## 2020-05-12 DIAGNOSIS — I10 ESSENTIAL HYPERTENSION: Primary | ICD-10-CM

## 2020-05-12 PROCEDURE — 99213 OFFICE O/P EST LOW 20 MIN: CPT | Performed by: FAMILY MEDICINE

## 2020-05-12 NOTE — PROGRESS NOTES
Subjective   Adriana Mcginnis is a 54 y.o. female.     History of Present Illness  Here to refill meds  Losartan 25mg qday.   Not on humara at present.   Not on fosamax at present.  Does have osteopenia.   Do bone densometer this summer. Not taking calcium or vitamin D.  bp at home  140/104. Taking losartan daily.    bp  130/90   130/90     124/90  Left arm    130/84      130 84   Increase losartan  To  25mg 1.5 tablets  q day     Decrease salt   Loose weight         Recheck bp 1 month   Do labs   Decrease salt          The following portions of the patient's history were reviewed and updated as appropriate: allergies, current medications, past family history, past medical history, past social history, past surgical history and problem list.        Review of Systems   Constitutional: Negative for fatigue.   Eyes: Negative for blurred vision.   Respiratory: Negative for apnea, chest tightness and shortness of breath.    Cardiovascular: Negative for chest pain and leg swelling.   Gastrointestinal: Negative for abdominal pain and vomiting.   Endocrine: Negative for polyuria.   Genitourinary: Negative for dysuria.   Musculoskeletal: Negative for back pain.   Skin: Negative for rash.   Neurological: Negative for dizziness and headache.   Psychiatric/Behavioral: The patient is not nervous/anxious.        Objective   Physical Exam   Constitutional: She is oriented to person, place, and time. She appears well-developed and well-nourished.   HENT:   Right Ear: External ear normal.   Left Ear: External ear normal.   Mouth/Throat: Oropharynx is clear and moist.   Eyes: Pupils are equal, round, and reactive to light.   Neck: Normal range of motion.   Cardiovascular: Normal rate and regular rhythm.   Pulmonary/Chest: Breath sounds normal. No respiratory distress. She has no rales.   Abdominal: There is no tenderness.   Musculoskeletal: She exhibits no edema.   Lymphadenopathy:     She has no cervical adenopathy.   Neurological:  She is alert and oriented to person, place, and time.   Skin: No rash noted.   Psychiatric: She has a normal mood and affect. Her behavior is normal. Judgment and thought content normal.   Vitals reviewed.        Assessment/Plan     Final diagnoses:   Essential hypertension     LABS ORDEDED  LOS  08589      SEEN IN OFFICE    15MINUTES

## 2020-05-14 DIAGNOSIS — M85.80 OSTEOPENIA, UNSPECIFIED LOCATION: Primary | ICD-10-CM

## 2020-05-16 LAB
ALBUMIN SERPL-MCNC: 4.6 G/DL (ref 3.5–5.2)
ALBUMIN/GLOB SERPL: 1.7 G/DL
ALP SERPL-CCNC: 69 U/L (ref 39–117)
ALT SERPL-CCNC: 44 U/L (ref 1–33)
AST SERPL-CCNC: 35 U/L (ref 1–32)
BASOPHILS # BLD AUTO: 0.09 10*3/MM3 (ref 0–0.2)
BASOPHILS NFR BLD AUTO: 1.4 % (ref 0–1.5)
BILIRUB SERPL-MCNC: 0.8 MG/DL (ref 0.2–1.2)
BUN SERPL-MCNC: 14 MG/DL (ref 6–20)
BUN/CREAT SERPL: 15.6 (ref 7–25)
CALCIUM SERPL-MCNC: 9.8 MG/DL (ref 8.6–10.5)
CHLORIDE SERPL-SCNC: 101 MMOL/L (ref 98–107)
CHOLEST SERPL-MCNC: 214 MG/DL (ref 0–200)
CO2 SERPL-SCNC: 27.1 MMOL/L (ref 22–29)
CREAT SERPL-MCNC: 0.9 MG/DL (ref 0.57–1)
EOSINOPHIL # BLD AUTO: 0.14 10*3/MM3 (ref 0–0.4)
EOSINOPHIL NFR BLD AUTO: 2.2 % (ref 0.3–6.2)
ERYTHROCYTE [DISTWIDTH] IN BLOOD BY AUTOMATED COUNT: 12.5 % (ref 12.3–15.4)
GLOBULIN SER CALC-MCNC: 2.7 GM/DL
GLUCOSE SERPL-MCNC: 83 MG/DL (ref 65–99)
HCT VFR BLD AUTO: 44.6 % (ref 34–46.6)
HDLC SERPL-MCNC: 50 MG/DL (ref 40–60)
HGB BLD-MCNC: 15.3 G/DL (ref 12–15.9)
IMM GRANULOCYTES # BLD AUTO: 0.01 10*3/MM3 (ref 0–0.05)
IMM GRANULOCYTES NFR BLD AUTO: 0.2 % (ref 0–0.5)
LDLC SERPL CALC-MCNC: 124 MG/DL (ref 0–100)
LYMPHOCYTES # BLD AUTO: 1.89 10*3/MM3 (ref 0.7–3.1)
LYMPHOCYTES NFR BLD AUTO: 30 % (ref 19.6–45.3)
MCH RBC QN AUTO: 31.2 PG (ref 26.6–33)
MCHC RBC AUTO-ENTMCNC: 34.3 G/DL (ref 31.5–35.7)
MCV RBC AUTO: 90.8 FL (ref 79–97)
MONOCYTES # BLD AUTO: 0.45 10*3/MM3 (ref 0.1–0.9)
MONOCYTES NFR BLD AUTO: 7.1 % (ref 5–12)
NEUTROPHILS # BLD AUTO: 3.73 10*3/MM3 (ref 1.7–7)
NEUTROPHILS NFR BLD AUTO: 59.1 % (ref 42.7–76)
NRBC BLD AUTO-RTO: 0 /100 WBC (ref 0–0.2)
PLATELET # BLD AUTO: 210 10*3/MM3 (ref 140–450)
POTASSIUM SERPL-SCNC: 4.1 MMOL/L (ref 3.5–5.2)
PROT SERPL-MCNC: 7.3 G/DL (ref 6–8.5)
RBC # BLD AUTO: 4.91 10*6/MM3 (ref 3.77–5.28)
SODIUM SERPL-SCNC: 139 MMOL/L (ref 136–145)
TRIGL SERPL-MCNC: 199 MG/DL (ref 0–150)
TSH SERPL DL<=0.005 MIU/L-ACNC: 1.93 UIU/ML (ref 0.27–4.2)
VLDLC SERPL CALC-MCNC: 39.8 MG/DL
WBC # BLD AUTO: 6.31 10*3/MM3 (ref 3.4–10.8)

## 2020-06-23 ENCOUNTER — HOSPITAL ENCOUNTER (OUTPATIENT)
Dept: BONE DENSITY | Facility: HOSPITAL | Age: 55
Discharge: HOME | End: 2020-06-23
Admitting: FAMILY MEDICINE

## 2020-06-23 DIAGNOSIS — M85.80 OSTEOPENIA, UNSPECIFIED LOCATION: ICD-10-CM

## 2020-06-23 PROCEDURE — 77080 DXA BONE DENSITY AXIAL: CPT

## 2020-07-14 RX ORDER — CLOBETASOL PROPIONATE 0.46 MG/ML
SOLUTION TOPICAL
Qty: 50 ML | Refills: 0 | OUTPATIENT
Start: 2020-07-14

## 2020-10-08 ENCOUNTER — SPECIALTY PHARMACY (OUTPATIENT)
Dept: PHARMACY | Facility: HOSPITAL | Age: 55
End: 2020-10-08

## 2020-10-12 ENCOUNTER — CLINICAL SUPPORT (OUTPATIENT)
Dept: FAMILY MEDICINE CLINIC | Facility: CLINIC | Age: 55
End: 2020-10-12

## 2020-10-12 DIAGNOSIS — Z23 NEED FOR INFLUENZA VACCINATION: ICD-10-CM

## 2020-10-12 PROCEDURE — 90686 IIV4 VACC NO PRSV 0.5 ML IM: CPT | Performed by: FAMILY MEDICINE

## 2020-10-12 PROCEDURE — 90471 IMMUNIZATION ADMIN: CPT | Performed by: FAMILY MEDICINE

## 2020-10-28 RX ORDER — HYDROXYCHLOROQUINE SULFATE 200 MG/1
200 TABLET, FILM COATED ORAL 2 TIMES DAILY
Qty: 14 TABLET | Refills: 0 | Status: SHIPPED | OUTPATIENT
Start: 2020-10-28 | End: 2020-11-04

## 2020-10-28 RX ORDER — AZITHROMYCIN 500 MG/1
500 TABLET, FILM COATED ORAL DAILY
Qty: 5 TABLET | Refills: 0 | Status: SHIPPED | OUTPATIENT
Start: 2020-10-28 | End: 2020-11-02

## 2020-10-29 NOTE — PROGRESS NOTES
Specialty Pharmacy Note      Name:  Adriana Mcginnis  :  1965  Date:  10/8/2020       Past Medical History:   Diagnosis Date   • Hypertension    • Parathyroid adenoma     benign   • Psoriasis    • Thyroid nodule        Past Surgical History:   Procedure Laterality Date   • CARPAL TUNNEL RELEASE     •  SECTION     • COLONOSCOPY N/A 2017    Procedure: COLONOSCOPY WITH POLYPECTOMY (COLD BIOPSY);  Surgeon: Lillian Leal MD;  Location: [facility] ENDOSCOPY;  Service:    • PARATHYROIDECTOMY         Social History     Socioeconomic History   • Marital status:      Spouse name: Not on file   • Number of children: Not on file   • Years of education: Not on file   • Highest education level: Not on file   Tobacco Use   • Smoking status: Never Smoker   • Smokeless tobacco: Never Used   Substance and Sexual Activity   • Alcohol use: Yes     Comment: occ   • Drug use: No   • Sexual activity: Defer       Family History   Problem Relation Age of Onset   • Hypertension Mother    • Heart disease Father    • Diabetes Father    • Thyroid cancer Brother        Allergies   Allergen Reactions   • Compazine [Prochlorperazine Edisylate] Swelling     Tongue swelling       Current Outpatient Medications   Medication Sig Dispense Refill   • Adalimumab (HUMIRA) 40 MG/0.8ML Pen-injector Kit Inject 40 mg under the skin every other week as directed. 2 each 5   • alendronate (FOSAMAX) 70 MG tablet Take 1 tablet by mouth Every 7 (Seven) Days. 12 tablet 3   • azithromycin (Zithromax) 500 MG tablet Take 1 tablet by mouth Daily for 5 days. 5 tablet 0   • Calcium Citrate-Vitamin D (CALCIUM + D PO) Take 1 tablet by mouth Daily.     • clobetasol (TEMOVATE) 0.05 % external solution APPLY  SOLUTION TOPICALLY TO AFFECTED AREA ONCE DAILY AS NEEDED 1 each 1   • hydroxychloroquine (PLAQUENIL) 200 MG tablet Take 1 tablet by mouth 2 (Two) Times a Day for 7 days. Indications: Probable COVID 14 tablet 0   • losartan (COZAAR) 25 MG  tablet Take 1 tablet by mouth Daily. 30 tablet 5   • montelukast (SINGULAIR) 10 MG tablet Take 10 mg by mouth Daily.       No current facility-administered medications for this visit.          LABORATORY:    Lab Results   Component Value Date    TSH 1.930 05/15/2020    [facility] 382 03/22/2017     No results found for: PROTIME, INR, PTT  Lab Results   Component Value Date    HAV Negative 03/22/2017       Last Urine Toxicity     There is no flowsheet data to display.          ASSESSMENT/PLAN:    Patient Update Assessment (new medications, allergies, medical history): No changes.     Medication(s): Humira 40 mg/0.8 ml pen-injector.     Currently Taking Medication(s): Patient reports taking medication as directed, injecting 40 mg once every other week as directed.     Experiencing Side Effects: None expressed at this time.     Prior Authorization Status: Approved.     Financial Assistance Status: Co-pay assistance card on file.  provided one-time debit card to pay $730. $0 remaining due from patient     Any Issues Identified: None expressed.     Appropriate to Process Prescription(s): After discussion with patient, it is appropriate to fill medication and prepare it for shipment. Medication will be dispensed from [facility] and delivered to patient delivery services per patient request.     Counseling Offered: Patient declined.  Patient is aware to call pharmacy with any questions or concerns.     Next Specialty Pharmacy Visit: Scheduled for 11/3/2020

## 2020-11-03 ENCOUNTER — SPECIALTY PHARMACY (OUTPATIENT)
Dept: PHARMACY | Facility: HOSPITAL | Age: 55
End: 2020-11-03

## 2020-11-11 RX ORDER — LOSARTAN POTASSIUM 25 MG/1
TABLET ORAL
Qty: 30 TABLET | Refills: 0 | OUTPATIENT
Start: 2020-11-11

## 2020-11-17 NOTE — PROGRESS NOTES
Specialty Pharmacy Note      Name:  Adriana Mcginnis  :  1965  Date:  2020    Past Medical History:   Diagnosis Date   • Hypertension    • Parathyroid adenoma     benign   • Psoriasis    • Thyroid nodule        Past Surgical History:   Procedure Laterality Date   • CARPAL TUNNEL RELEASE     •  SECTION     • COLONOSCOPY N/A 2017    Procedure: COLONOSCOPY WITH POLYPECTOMY (COLD BIOPSY);  Surgeon: Lillian Leal MD;  Location: [facility] ENDOSCOPY;  Service:    • PARATHYROIDECTOMY         Social History     Socioeconomic History   • Marital status:      Spouse name: Not on file   • Number of children: Not on file   • Years of education: Not on file   • Highest education level: Not on file   Tobacco Use   • Smoking status: Never Smoker   • Smokeless tobacco: Never Used   Substance and Sexual Activity   • Alcohol use: Yes     Comment: occ   • Drug use: No   • Sexual activity: Defer       Family History   Problem Relation Age of Onset   • Hypertension Mother    • Heart disease Father    • Diabetes Father    • Thyroid cancer Brother        Allergies   Allergen Reactions   • Compazine [Prochlorperazine Edisylate] Swelling     Tongue swelling       Current Outpatient Medications   Medication Sig Dispense Refill   • Adalimumab (HUMIRA) 40 MG/0.8ML Pen-injector Kit Inject 40 mg under the skin every other week as directed. 2 each 5   • alendronate (FOSAMAX) 70 MG tablet Take 1 tablet by mouth Every 7 (Seven) Days. 12 tablet 3   • Calcium Citrate-Vitamin D (CALCIUM + D PO) Take 1 tablet by mouth Daily.     • clobetasol (TEMOVATE) 0.05 % external solution APPLY  SOLUTION TOPICALLY TO AFFECTED AREA ONCE DAILY AS NEEDED 1 each 1   • losartan (COZAAR) 25 MG tablet Take 1 tablet by mouth Daily. 30 tablet 5   • montelukast (SINGULAIR) 10 MG tablet Take 10 mg by mouth Daily.       No current facility-administered medications for this visit.          LABORATORY:    Lab Results   Component Value Date     TSH 1.930 05/15/2020    [facility] 382 03/22/2017     No results found for: PROTIME, INR, PTT  Lab Results   Component Value Date    HAV Negative 03/22/2017       Last Urine Toxicity     There is no flowsheet data to display.          ASSESSMENT/PLAN:    Patient Update Assessment (new medications, allergies, medical history): No changes.     Medication(s): Humira 40 mg/0.8 ml pen-injector.     Currently Taking Medication(s): Patient reports taking medication as directed, injecting 40 mg once every other week as directed.     Experiencing Side Effects: None expressed at this time.     Prior Authorization Status: Approved.     Financial Assistance Status: Co-pay assistance card on file. Total due from patient is $5    Any Issues Identified: None expressed.     Appropriate to Process Prescription(s): After discussion with patient, it is appropriate to fill medication and prepare it for shipment. Medication will be dispensed from [facility] and delivered to patient delivery services per patient request.     Counseling Offered: Patient declined.  Patient is aware to call pharmacy with any questions or concerns.     Next Specialty Pharmacy Visit: Scheduled for 12/1/2020

## 2020-12-01 ENCOUNTER — SPECIALTY PHARMACY (OUTPATIENT)
Dept: PHARMACY | Facility: HOSPITAL | Age: 55
End: 2020-12-01

## 2020-12-08 NOTE — PROGRESS NOTES
Specialty Pharmacy Note      Name:  Adriana Mcginnis  :  1965  Date:  2020    Past Medical History:   Diagnosis Date   • Hypertension    • Parathyroid adenoma     benign   • Psoriasis    • Thyroid nodule        Past Surgical History:   Procedure Laterality Date   • CARPAL TUNNEL RELEASE     •  SECTION     • COLONOSCOPY N/A 2017    Procedure: COLONOSCOPY WITH POLYPECTOMY (COLD BIOPSY);  Surgeon: Lillian Leal MD;  Location: [facility] ENDOSCOPY;  Service:    • PARATHYROIDECTOMY         Social History     Socioeconomic History   • Marital status:      Spouse name: Not on file   • Number of children: Not on file   • Years of education: Not on file   • Highest education level: Not on file   Tobacco Use   • Smoking status: Never Smoker   • Smokeless tobacco: Never Used   Substance and Sexual Activity   • Alcohol use: Yes     Comment: occ   • Drug use: No   • Sexual activity: Defer       Family History   Problem Relation Age of Onset   • Hypertension Mother    • Heart disease Father    • Diabetes Father    • Thyroid cancer Brother        Allergies   Allergen Reactions   • Compazine [Prochlorperazine Edisylate] Swelling     Tongue swelling       Current Outpatient Medications   Medication Sig Dispense Refill   • Adalimumab (HUMIRA) 40 MG/0.8ML Pen-injector Kit Inject 40 mg under the skin every other week as directed. 2 each 5   • alendronate (FOSAMAX) 70 MG tablet Take 1 tablet by mouth Every 7 (Seven) Days. 12 tablet 3   • Calcium Citrate-Vitamin D (CALCIUM + D PO) Take 1 tablet by mouth Daily.     • clobetasol (TEMOVATE) 0.05 % external solution APPLY  SOLUTION TOPICALLY TO AFFECTED AREA ONCE DAILY AS NEEDED 1 each 1   • losartan (COZAAR) 25 MG tablet Take 1 tablet by mouth Daily. 30 tablet 5   • montelukast (SINGULAIR) 10 MG tablet Take 10 mg by mouth Daily.       No current facility-administered medications for this visit.          LABORATORY:    Lab Results   Component Value Date     TSH 1.930 05/15/2020    [facility] 382 03/22/2017     No results found for: PROTIME, INR, PTT  Lab Results   Component Value Date    HAV Negative 03/22/2017       Last Urine Toxicity     There is no flowsheet data to display.          ASSESSMENT/PLAN:    Patient Update Assessment (new medications, allergies, medical history): No changes.     Medication(s): Humira 40 mg/0.8 ml pen-injector.     Currently Taking Medication(s): Patient reports taking medication as directed, injecting 40 mg once every other week as directed.     Experiencing Side Effects: None expressed at this time.     Prior Authorization Status: Approved.     Financial Assistance Status: Co-pay assistance card on file. Total due from patient is $5    Any Issues Identified: None expressed.     Appropriate to Process Prescription(s): After discussion with patient, it is appropriate to fill medication and prepare it for shipment. Medication will be dispensed from [facility] and delivered to patient delivery services per patient request.     Counseling Offered: Patient declined.  Patient is aware to call pharmacy with any questions or concerns.     Next Specialty Pharmacy Visit: Scheduled for 12/29/2020

## 2020-12-29 ENCOUNTER — SPECIALTY PHARMACY (OUTPATIENT)
Dept: PHARMACY | Facility: HOSPITAL | Age: 55
End: 2020-12-29

## 2021-01-15 NOTE — PROGRESS NOTES
Specialty Pharmacy Note      Name:  Adriana Mcginnis  :  1965  Date:  2020       Past Medical History:   Diagnosis Date   • Hypertension    • Parathyroid adenoma     benign   • Psoriasis    • Thyroid nodule        Past Surgical History:   Procedure Laterality Date   • CARPAL TUNNEL RELEASE     •  SECTION     • COLONOSCOPY N/A 2017    Procedure: COLONOSCOPY WITH POLYPECTOMY (COLD BIOPSY);  Surgeon: Lillian Leal MD;  Location: [facility] ENDOSCOPY;  Service:    • PARATHYROIDECTOMY         Social History     Socioeconomic History   • Marital status:      Spouse name: Not on file   • Number of children: Not on file   • Years of education: Not on file   • Highest education level: Not on file   Tobacco Use   • Smoking status: Never Smoker   • Smokeless tobacco: Never Used   Substance and Sexual Activity   • Alcohol use: Yes     Comment: occ   • Drug use: No   • Sexual activity: Defer       Family History   Problem Relation Age of Onset   • Hypertension Mother    • Heart disease Father    • Diabetes Father    • Thyroid cancer Brother        Allergies   Allergen Reactions   • Compazine [Prochlorperazine Edisylate] Swelling     Tongue swelling       Current Outpatient Medications   Medication Sig Dispense Refill   • Adalimumab (HUMIRA) 40 MG/0.8ML Pen-injector Kit Inject 40 mg under the skin every other week as directed. 2 each 5   • alendronate (FOSAMAX) 70 MG tablet Take 1 tablet by mouth Every 7 (Seven) Days. 12 tablet 3   • Calcium Citrate-Vitamin D (CALCIUM + D PO) Take 1 tablet by mouth Daily.     • clobetasol (TEMOVATE) 0.05 % external solution APPLY  SOLUTION TOPICALLY TO AFFECTED AREA ONCE DAILY AS NEEDED 1 each 1   • losartan (COZAAR) 25 MG tablet Take 1 tablet by mouth Daily. 30 tablet 5   • montelukast (SINGULAIR) 10 MG tablet Take 10 mg by mouth Daily.       No current facility-administered medications for this visit.          LABORATORY:    Lab Results   Component Value  Date    WBC 6.31 05/15/2020    HGB 15.3 05/15/2020    HCT 44.6 05/15/2020    MCV 90.8 05/15/2020    RDW 12.5 05/15/2020     05/15/2020    NEUTRORELPCT 59.1 05/15/2020    LYMPHORELPCT 30.0 05/15/2020    MONORELPCT 7.1 05/15/2020    EOSRELPCT 2.2 05/15/2020    BASORELPCT 1.4 05/15/2020    NEUTROABS 3.73 05/15/2020    LYMPHSABS 1.89 05/15/2020       Lab Results   Component Value Date     05/15/2020    K 4.1 05/15/2020    CO2 27.1 05/15/2020     05/15/2020    BUN 14 05/15/2020    CREATININE 0.90 05/15/2020    GLUCOSE 86 12/16/2015    CALCIUM 9.8 05/15/2020    ALKPHOS 69 05/15/2020    AST 35 (H) 05/15/2020    ALT 44 (H) 05/15/2020    BILITOT 0.8 05/15/2020    ALBUMIN 4.60 05/15/2020    PROTEINTOT 7.0 12/16/2015       No results found for: LDH, URICACID     Imaging Results (Last 24 Hours)     ** No results found for the last 24 hours. **          ASSESSMENT/PLAN:    Patient Update Assessment (new medications, allergies, medical history): No changes.     Medication(s): Humira 40 mg/0.8 ml pen-injector.     Currently Taking Medication(s): Patient reports taking medication as directed, injecting 40 mg once every other week as directed.     Experiencing Side Effects: None expressed at this time.     Prior Authorization Status: Approved.     Financial Assistance Status: Co-pay assistance card on file. Total due from patient is $5     Any Issues Identified: None expressed.     Appropriate to Process Prescription(s): After discussion with patient, it is appropriate to fill medication and prepare it for shipment. Medication will be dispensed from [facility] and delivered to patient delivery services per patient request.     Counseling Offered: Patient declined.  Patient is aware to call pharmacy with any questions or concerns.     Next Specialty Pharmacy Visit:  01/21/2021

## 2021-01-21 ENCOUNTER — SPECIALTY PHARMACY (OUTPATIENT)
Dept: PHARMACY | Facility: HOSPITAL | Age: 56
End: 2021-01-21

## 2021-02-08 NOTE — PROGRESS NOTES
Specialty Pharmacy Note      Name:  Adriana Mcginnis  :  1965  Date:  2021       Past Medical History:   Diagnosis Date   • Hypertension    • Parathyroid adenoma     benign   • Psoriasis    • Thyroid nodule        Past Surgical History:   Procedure Laterality Date   • CARPAL TUNNEL RELEASE     •  SECTION     • COLONOSCOPY N/A 2017    Procedure: COLONOSCOPY WITH POLYPECTOMY (COLD BIOPSY);  Surgeon: Lillian Leal MD;  Location: [facility] ENDOSCOPY;  Service:    • PARATHYROIDECTOMY         Social History     Socioeconomic History   • Marital status:      Spouse name: Not on file   • Number of children: Not on file   • Years of education: Not on file   • Highest education level: Not on file   Tobacco Use   • Smoking status: Never Smoker   • Smokeless tobacco: Never Used   Substance and Sexual Activity   • Alcohol use: Yes     Comment: occ   • Drug use: No   • Sexual activity: Defer       Family History   Problem Relation Age of Onset   • Hypertension Mother    • Heart disease Father    • Diabetes Father    • Thyroid cancer Brother        Allergies   Allergen Reactions   • Compazine [Prochlorperazine Edisylate] Swelling     Tongue swelling       Current Outpatient Medications   Medication Sig Dispense Refill   • Adalimumab (HUMIRA) 40 MG/0.8ML Pen-injector Kit Inject 40 mg under the skin every other week as directed. 2 each 5   • alendronate (FOSAMAX) 70 MG tablet Take 1 tablet by mouth Every 7 (Seven) Days. 12 tablet 3   • Calcium Citrate-Vitamin D (CALCIUM + D PO) Take 1 tablet by mouth Daily.     • clobetasol (TEMOVATE) 0.05 % external solution APPLY  SOLUTION TOPICALLY TO AFFECTED AREA ONCE DAILY AS NEEDED 1 each 1   • losartan (COZAAR) 25 MG tablet Take 1 tablet by mouth Daily. 30 tablet 5   • montelukast (SINGULAIR) 10 MG tablet Take 10 mg by mouth Daily.       No current facility-administered medications for this visit.          LABORATORY:    Lab Results   Component Value  Date    WBC 6.31 05/15/2020    HGB 15.3 05/15/2020    HCT 44.6 05/15/2020    MCV 90.8 05/15/2020    RDW 12.5 05/15/2020     05/15/2020    NEUTRORELPCT 59.1 05/15/2020    LYMPHORELPCT 30.0 05/15/2020    MONORELPCT 7.1 05/15/2020    EOSRELPCT 2.2 05/15/2020    BASORELPCT 1.4 05/15/2020    NEUTROABS 3.73 05/15/2020    LYMPHSABS 1.89 05/15/2020       Lab Results   Component Value Date     05/15/2020    K 4.1 05/15/2020    CO2 27.1 05/15/2020     05/15/2020    BUN 14 05/15/2020    CREATININE 0.90 05/15/2020    GLUCOSE 86 12/16/2015    CALCIUM 9.8 05/15/2020    ALKPHOS 69 05/15/2020    AST 35 (H) 05/15/2020    ALT 44 (H) 05/15/2020    BILITOT 0.8 05/15/2020    ALBUMIN 4.60 05/15/2020    PROTEINTOT 7.0 12/16/2015       No results found for: LDH, URICACID     Imaging Results (Last 24 Hours)     ** No results found for the last 24 hours. **          ASSESSMENT/PLAN:    Patient Update Assessment (new medications, allergies, medical history): No changes.     Medication(s): Humira 40 mg/0.8 ml pen-injector.     Currently Taking Medication(s): Patient reports taking medication as directed, injecting 40 mg once every other week as directed.     Experiencing Side Effects: None expressed at this time.     Prior Authorization Status: Approved.     Financial Assistance Status: Co-pay assistance card on file. Total due from patient is $5     Any Issues Identified: None expressed.     Appropriate to Process Prescription(s): After discussion with patient, it is appropriate to fill medication and prepare it for shipment. Medication will be dispensed from [facility] and delivered to patient delivery services per patient request.     Counseling Offered: Patient declined.  Patient is aware to call pharmacy with any questions or concerns.     Next Specialty Pharmacy Visit:  02/25/2021

## 2021-02-15 ENCOUNTER — CLINICAL SUPPORT (OUTPATIENT)
Dept: FAMILY MEDICINE CLINIC | Facility: CLINIC | Age: 56
End: 2021-02-15

## 2021-02-15 DIAGNOSIS — Z23 IMMUNIZATION DUE: Primary | ICD-10-CM

## 2021-02-15 PROCEDURE — 90471 IMMUNIZATION ADMIN: CPT | Performed by: FAMILY MEDICINE

## 2021-02-15 PROCEDURE — 90750 HZV VACC RECOMBINANT IM: CPT | Performed by: FAMILY MEDICINE

## 2021-02-15 NOTE — PROGRESS NOTES
Immunization  Immunization performed in LD   by Anat Gonzales MA. Patient tolerated the procedure well without complications.  02/15/21   Anat Gonzales MA

## 2021-02-25 ENCOUNTER — SPECIALTY PHARMACY (OUTPATIENT)
Dept: PHARMACY | Facility: HOSPITAL | Age: 56
End: 2021-02-25

## 2021-02-26 NOTE — PROGRESS NOTES
Specialty Pharmacy Note      Name:  Adriana Mcginnis  :  1965  Date:  2021         Past Medical History:   Diagnosis Date   • Hypertension    • Parathyroid adenoma     benign   • Psoriasis    • Thyroid nodule        Past Surgical History:   Procedure Laterality Date   • CARPAL TUNNEL RELEASE     •  SECTION     • COLONOSCOPY N/A 2017    Procedure: COLONOSCOPY WITH POLYPECTOMY (COLD BIOPSY);  Surgeon: Lillian Leal MD;  Location: [facility] ENDOSCOPY;  Service:    • PARATHYROIDECTOMY         Social History     Socioeconomic History   • Marital status:      Spouse name: Not on file   • Number of children: Not on file   • Years of education: Not on file   • Highest education level: Not on file   Tobacco Use   • Smoking status: Never Smoker   • Smokeless tobacco: Never Used   Substance and Sexual Activity   • Alcohol use: Yes     Comment: occ   • Drug use: No   • Sexual activity: Defer       Family History   Problem Relation Age of Onset   • Hypertension Mother    • Heart disease Father    • Diabetes Father    • Thyroid cancer Brother        Allergies   Allergen Reactions   • Compazine [Prochlorperazine Edisylate] Swelling     Tongue swelling       Current Outpatient Medications   Medication Sig Dispense Refill   • Adalimumab (HUMIRA) 40 MG/0.8ML Pen-injector Kit Inject 40 mg under the skin every other week as directed. 2 each 5   • alendronate (FOSAMAX) 70 MG tablet Take 1 tablet by mouth Every 7 (Seven) Days. 12 tablet 3   • Calcium Citrate-Vitamin D (CALCIUM + D PO) Take 1 tablet by mouth Daily.     • clobetasol (TEMOVATE) 0.05 % external solution APPLY  SOLUTION TOPICALLY TO AFFECTED AREA ONCE DAILY AS NEEDED 1 each 1   • losartan (COZAAR) 25 MG tablet Take 1 tablet by mouth Daily. 30 tablet 5   • montelukast (SINGULAIR) 10 MG tablet Take 10 mg by mouth Daily.       No current facility-administered medications for this visit.          LABORATORY:    Lab Results   Component Value  Date    WBC 6.31 05/15/2020    HGB 15.3 05/15/2020    HCT 44.6 05/15/2020    MCV 90.8 05/15/2020    RDW 12.5 05/15/2020     05/15/2020    NEUTRORELPCT 59.1 05/15/2020    LYMPHORELPCT 30.0 05/15/2020    MONORELPCT 7.1 05/15/2020    EOSRELPCT 2.2 05/15/2020    BASORELPCT 1.4 05/15/2020    NEUTROABS 3.73 05/15/2020    LYMPHSABS 1.89 05/15/2020       Lab Results   Component Value Date     05/15/2020    K 4.1 05/15/2020    CO2 27.1 05/15/2020     05/15/2020    BUN 14 05/15/2020    CREATININE 0.90 05/15/2020    GLUCOSE 86 12/16/2015    CALCIUM 9.8 05/15/2020    ALKPHOS 69 05/15/2020    AST 35 (H) 05/15/2020    ALT 44 (H) 05/15/2020    BILITOT 0.8 05/15/2020    ALBUMIN 4.60 05/15/2020    PROTEINTOT 7.0 12/16/2015       No results found for: LDH, URICACID     Imaging Results (Last 24 Hours)     ** No results found for the last 24 hours. **          ASSESSMENT/PLAN:    Patient Update Assessment (new medications, allergies, medical history): No changes.     Medication(s): Humira 40 mg/0.8 ml pen-injector.     Currently Taking Medication(s): Patient reports taking medication as directed, injecting 40 mg once every other week as directed.     Experiencing Side Effects: None expressed at this time.     Prior Authorization Status: Approved.     Financial Assistance Status: Co-pay assistance card on file. Total due from patient is $5     Any Issues Identified: None expressed.     Appropriate to Process Prescription(s): After discussion with patient, it is appropriate to fill medication and prepare it for shipment. Medication will be dispensed from [facility] and delivered to patient delivery services per patient request.     Counseling Offered: Patient declined.  Patient is aware to call pharmacy with any questions or concerns.     Next Specialty Pharmacy Visit: Scheduled in four weeks.

## 2021-03-24 ENCOUNTER — SPECIALTY PHARMACY (OUTPATIENT)
Dept: PHARMACY | Facility: HOSPITAL | Age: 56
End: 2021-03-24

## 2021-03-25 NOTE — PROGRESS NOTES
Specialty Pharmacy Note      Name:  Adriana Mcginnis  :  1965  Date:  3/25/2021         Past Medical History:   Diagnosis Date   • Hypertension    • Parathyroid adenoma     benign   • Psoriasis    • Thyroid nodule        Past Surgical History:   Procedure Laterality Date   • CARPAL TUNNEL RELEASE     •  SECTION     • COLONOSCOPY N/A 2017    Procedure: COLONOSCOPY WITH POLYPECTOMY (COLD BIOPSY);  Surgeon: Lillian Leal MD;  Location: [facility] ENDOSCOPY;  Service:    • PARATHYROIDECTOMY         Social History     Socioeconomic History   • Marital status:      Spouse name: Not on file   • Number of children: Not on file   • Years of education: Not on file   • Highest education level: Not on file   Tobacco Use   • Smoking status: Never Smoker   • Smokeless tobacco: Never Used   Substance and Sexual Activity   • Alcohol use: Yes     Comment: occ   • Drug use: No   • Sexual activity: Defer       Family History   Problem Relation Age of Onset   • Hypertension Mother    • Heart disease Father    • Diabetes Father    • Thyroid cancer Brother        Allergies   Allergen Reactions   • Compazine [Prochlorperazine Edisylate] Swelling     Tongue swelling       Current Outpatient Medications   Medication Sig Dispense Refill   • Adalimumab (Humira Pen) 40 MG/0.8ML Pen-injector Kit Inject 40 mg under the skin every other week as directed. 2 each 5   • alendronate (FOSAMAX) 70 MG tablet Take 1 tablet by mouth Every 7 (Seven) Days. 12 tablet 3   • Calcium Citrate-Vitamin D (CALCIUM + D PO) Take 1 tablet by mouth Daily.     • clobetasol (TEMOVATE) 0.05 % external solution APPLY  SOLUTION TOPICALLY TO AFFECTED AREA ONCE DAILY AS NEEDED 1 each 1   • losartan (COZAAR) 25 MG tablet Take 1 tablet by mouth Daily. 30 tablet 5   • montelukast (SINGULAIR) 10 MG tablet Take 10 mg by mouth Daily.       No current facility-administered medications for this visit.         LABORATORY:    Lab Results   Component  Value Date    WBC 6.31 05/15/2020    HGB 15.3 05/15/2020    HCT 44.6 05/15/2020    MCV 90.8 05/15/2020    RDW 12.5 05/15/2020     05/15/2020    NEUTRORELPCT 59.1 05/15/2020    LYMPHORELPCT 30.0 05/15/2020    MONORELPCT 7.1 05/15/2020    EOSRELPCT 2.2 05/15/2020    BASORELPCT 1.4 05/15/2020    NEUTROABS 3.73 05/15/2020    LYMPHSABS 1.89 05/15/2020       Lab Results   Component Value Date     05/15/2020    K 4.1 05/15/2020    CO2 27.1 05/15/2020     05/15/2020    BUN 14 05/15/2020    CREATININE 0.90 05/15/2020    GLUCOSE 86 12/16/2015    CALCIUM 9.8 05/15/2020    ALKPHOS 69 05/15/2020    AST 35 (H) 05/15/2020    ALT 44 (H) 05/15/2020    BILITOT 0.8 05/15/2020    ALBUMIN 4.60 05/15/2020    PROTEINTOT 7.0 12/16/2015       No results found for: LDH, URICACID     Imaging Results (Last 24 Hours)     ** No results found for the last 24 hours. **          ASSESSMENT/PLAN:    Patient Update Assessment (new medications, allergies, medical history): No changes.     Medication(s): Humira 40 mg/0.8 ml pen-injector.     Currently Taking Medication(s): Patient reports taking medication as directed, injecting 40 mg once every other week as directed.     Experiencing Side Effects: None expressed at this time.     Prior Authorization Status: Approved.     Financial Assistance Status: Co-pay assistance card on file. Total due from patient is $5     Any Issues Identified: None expressed.     Appropriate to Process Prescription(s): After discussion with patient, it is appropriate to fill medication and prepare it for shipment. Medication will be dispensed from [facility] and delivered to patient delivery services per patient request.     Counseling Offered: Patient declined.  Patient is aware to call pharmacy with any questions or concerns.     Next Specialty Pharmacy Visit: Scheduled in four weeks.

## 2021-03-30 ENCOUNTER — BULK ORDERING (OUTPATIENT)
Dept: CASE MANAGEMENT | Facility: OTHER | Age: 56
End: 2021-03-30

## 2021-03-30 DIAGNOSIS — Z23 IMMUNIZATION DUE: ICD-10-CM

## 2021-04-21 ENCOUNTER — SPECIALTY PHARMACY (OUTPATIENT)
Dept: PHARMACY | Facility: HOSPITAL | Age: 56
End: 2021-04-21

## 2021-04-23 NOTE — PROGRESS NOTES
Specialty Pharmacy Note      Name:  Adriana Mcginnis  :  1965  Date:  2021         Past Medical History:   Diagnosis Date   • Hypertension    • Parathyroid adenoma     benign   • Psoriasis    • Thyroid nodule        Past Surgical History:   Procedure Laterality Date   • CARPAL TUNNEL RELEASE     •  SECTION     • COLONOSCOPY N/A 2017    Procedure: COLONOSCOPY WITH POLYPECTOMY (COLD BIOPSY);  Surgeon: Lillian Leal MD;  Location: [facility] ENDOSCOPY;  Service:    • PARATHYROIDECTOMY         Social History     Socioeconomic History   • Marital status:      Spouse name: Not on file   • Number of children: Not on file   • Years of education: Not on file   • Highest education level: Not on file   Tobacco Use   • Smoking status: Never Smoker   • Smokeless tobacco: Never Used   Substance and Sexual Activity   • Alcohol use: Yes     Comment: occ   • Drug use: No   • Sexual activity: Defer       Family History   Problem Relation Age of Onset   • Hypertension Mother    • Heart disease Father    • Diabetes Father    • Thyroid cancer Brother        Allergies   Allergen Reactions   • Compazine [Prochlorperazine Edisylate] Swelling     Tongue swelling       Current Outpatient Medications   Medication Sig Dispense Refill   • Adalimumab (Humira Pen) 40 MG/0.8ML Pen-injector Kit Inject 40 mg under the skin every other week as directed. 2 each 5   • alendronate (FOSAMAX) 70 MG tablet Take 1 tablet by mouth Every 7 (Seven) Days. 12 tablet 3   • Calcium Citrate-Vitamin D (CALCIUM + D PO) Take 1 tablet by mouth Daily.     • clobetasol (TEMOVATE) 0.05 % external solution APPLY  SOLUTION TOPICALLY TO AFFECTED AREA ONCE DAILY AS NEEDED 1 each 1   • losartan (COZAAR) 25 MG tablet Take 1 tablet by mouth Daily. 30 tablet 5   • montelukast (SINGULAIR) 10 MG tablet Take 10 mg by mouth Daily.       No current facility-administered medications for this visit.         LABORATORY:    Lab Results   Component  Value Date    WBC 6.31 05/15/2020    HGB 15.3 05/15/2020    HCT 44.6 05/15/2020    MCV 90.8 05/15/2020    RDW 12.5 05/15/2020     05/15/2020    NEUTRORELPCT 59.1 05/15/2020    LYMPHORELPCT 30.0 05/15/2020    MONORELPCT 7.1 05/15/2020    EOSRELPCT 2.2 05/15/2020    BASORELPCT 1.4 05/15/2020    NEUTROABS 3.73 05/15/2020    LYMPHSABS 1.89 05/15/2020       Lab Results   Component Value Date     05/15/2020    K 4.1 05/15/2020    CO2 27.1 05/15/2020     05/15/2020    BUN 14 05/15/2020    CREATININE 0.90 05/15/2020    GLUCOSE 86 12/16/2015    CALCIUM 9.8 05/15/2020    ALKPHOS 69 05/15/2020    AST 35 (H) 05/15/2020    ALT 44 (H) 05/15/2020    BILITOT 0.8 05/15/2020    ALBUMIN 4.60 05/15/2020    PROTEINTOT 7.0 12/16/2015       No results found for: LDH, URICACID     Imaging Results (Last 24 Hours)     ** No results found for the last 24 hours. **          ASSESSMENT/PLAN:    Patient Update Assessment (new medications, allergies, medical history): No changes.     Medication(s): Humira 40 mg/0.8 ml pen-injector.     Currently Taking Medication(s): Patient reports taking medication as directed, injecting 40 mg once every other week as directed.     Experiencing Side Effects: None expressed at this time.     Prior Authorization Status: Approved.     Financial Assistance Status: Co-pay assistance card on file. Total due from patient is $5     Any Issues Identified: None expressed.     Appropriate to Process Prescription(s): After discussion with patient, it is appropriate to fill medication and prepare it for shipment. Medication will be dispensed from [facility] and delivered to patient delivery services per patient request.     Counseling Offered: Patient declined.  Patient is aware to call pharmacy with any questions or concerns.     Next Specialty Pharmacy Visit: Scheduled in four weeks.

## 2021-05-15 RX ORDER — LOSARTAN POTASSIUM 25 MG/1
TABLET ORAL
Qty: 45 TABLET | Refills: 0 | Status: SHIPPED | OUTPATIENT
Start: 2021-05-15 | End: 2021-05-17 | Stop reason: SDUPTHER

## 2021-05-17 ENCOUNTER — CLINICAL SUPPORT (OUTPATIENT)
Dept: FAMILY MEDICINE CLINIC | Facility: CLINIC | Age: 56
End: 2021-05-17

## 2021-05-17 DIAGNOSIS — Z23 IMMUNIZATION DUE: Primary | ICD-10-CM

## 2021-05-17 PROCEDURE — 90750 HZV VACC RECOMBINANT IM: CPT | Performed by: FAMILY MEDICINE

## 2021-05-17 RX ORDER — LOSARTAN POTASSIUM 25 MG/1
37.5 TABLET ORAL DAILY
Qty: 45 TABLET | Refills: 0 | Status: SHIPPED | OUTPATIENT
Start: 2021-05-17 | End: 2021-12-08 | Stop reason: SDUPTHER

## 2021-05-17 NOTE — PROGRESS NOTES
Immunization  Immunization performed in LT by Lucinda Nava LPN. Patient tolerated the procedure well without complications.  05/17/21   Lucinda Nava LPN

## 2021-05-20 ENCOUNTER — SPECIALTY PHARMACY (OUTPATIENT)
Dept: PHARMACY | Facility: HOSPITAL | Age: 56
End: 2021-05-20

## 2021-05-28 NOTE — PROGRESS NOTES
Specialty Pharmacy Note      Name:  Adriana Mcginnis  :  1965  Date:  2021         Past Medical History:   Diagnosis Date   • Hypertension    • Parathyroid adenoma     benign   • Psoriasis    • Thyroid nodule        Past Surgical History:   Procedure Laterality Date   • CARPAL TUNNEL RELEASE     •  SECTION     • COLONOSCOPY N/A 2017    Procedure: COLONOSCOPY WITH POLYPECTOMY (COLD BIOPSY);  Surgeon: Lillian Leal MD;  Location: [facility] ENDOSCOPY;  Service:    • PARATHYROIDECTOMY         Social History     Socioeconomic History   • Marital status:      Spouse name: Not on file   • Number of children: Not on file   • Years of education: Not on file   • Highest education level: Not on file   Tobacco Use   • Smoking status: Never Smoker   • Smokeless tobacco: Never Used   Substance and Sexual Activity   • Alcohol use: Yes     Comment: occ   • Drug use: No   • Sexual activity: Defer       Family History   Problem Relation Age of Onset   • Hypertension Mother    • Heart disease Father    • Diabetes Father    • Thyroid cancer Brother        Allergies   Allergen Reactions   • Compazine [Prochlorperazine Edisylate] Swelling     Tongue swelling       Current Outpatient Medications   Medication Sig Dispense Refill   • Adalimumab (Humira Pen) 40 MG/0.8ML Pen-injector Kit Inject 40 mg under the skin every other week as directed. 2 each 5   • alendronate (FOSAMAX) 70 MG tablet Take 1 tablet by mouth Every 7 (Seven) Days. 12 tablet 3   • Calcium Citrate-Vitamin D (CALCIUM + D PO) Take 1 tablet by mouth Daily.     • clobetasol (TEMOVATE) 0.05 % external solution APPLY  SOLUTION TOPICALLY TO AFFECTED AREA ONCE DAILY AS NEEDED 1 each 1   • losartan (COZAAR) 25 MG tablet Take 1.5 tablets by mouth Daily. 45 tablet 0   • montelukast (SINGULAIR) 10 MG tablet Take 10 mg by mouth Daily.       No current facility-administered medications for this visit.         LABORATORY:    Lab Results   Component  Value Date    WBC 6.31 05/15/2020    HGB 15.3 05/15/2020    HCT 44.6 05/15/2020    MCV 90.8 05/15/2020    RDW 12.5 05/15/2020     05/15/2020    NEUTRORELPCT 59.1 05/15/2020    LYMPHORELPCT 30.0 05/15/2020    MONORELPCT 7.1 05/15/2020    EOSRELPCT 2.2 05/15/2020    BASORELPCT 1.4 05/15/2020    NEUTROABS 3.73 05/15/2020    LYMPHSABS 1.89 05/15/2020       Lab Results   Component Value Date     05/15/2020    K 4.1 05/15/2020    CO2 27.1 05/15/2020     05/15/2020    BUN 14 05/15/2020    CREATININE 0.90 05/15/2020    GLUCOSE 86 12/16/2015    CALCIUM 9.8 05/15/2020    ALKPHOS 69 05/15/2020    AST 35 (H) 05/15/2020    ALT 44 (H) 05/15/2020    BILITOT 0.8 05/15/2020    ALBUMIN 4.60 05/15/2020    PROTEINTOT 7.0 12/16/2015       No results found for: LDH, URICACID     Imaging Results (Last 24 Hours)     ** No results found for the last 24 hours. **          ASSESSMENT/PLAN:    Patient Update Assessment (new medications, allergies, medical history): No changes.     Medication(s): Humira 40 mg/0.8 ml pen-injector.     Currently Taking Medication(s): Patient reports taking medication as directed, injecting 40 mg once every other week as directed.     Experiencing Side Effects: None expressed at this time.     Prior Authorization Status: Approved.     Financial Assistance Status: Co-pay assistance card on file. Total due from patient is $5     Any Issues Identified: None expressed.     Appropriate to Process Prescription(s): After discussion with patient, it is appropriate to fill medication and prepare it for shipment. Medication will be dispensed from [facility] and delivered to patient delivery services per patient request.     Counseling Offered: Patient declined.  Patient is aware to call pharmacy with any questions or concerns.     Next Specialty Pharmacy Visit: Scheduled in four weeks.

## 2021-06-28 ENCOUNTER — SPECIALTY PHARMACY (OUTPATIENT)
Dept: PHARMACY | Facility: HOSPITAL | Age: 56
End: 2021-06-28

## 2021-07-23 NOTE — PROGRESS NOTES
Specialty Pharmacy Note      Name:  Adriana Mcginnis  :  1965  Date:  2021         Past Medical History:   Diagnosis Date   • Hypertension    • Parathyroid adenoma     benign   • Psoriasis    • Thyroid nodule        Past Surgical History:   Procedure Laterality Date   • CARPAL TUNNEL RELEASE     •  SECTION     • COLONOSCOPY N/A 2017    Procedure: COLONOSCOPY WITH POLYPECTOMY (COLD BIOPSY);  Surgeon: Lillian Leal MD;  Location: [facility] ENDOSCOPY;  Service:    • PARATHYROIDECTOMY         Social History     Socioeconomic History   • Marital status:      Spouse name: Not on file   • Number of children: Not on file   • Years of education: Not on file   • Highest education level: Not on file   Tobacco Use   • Smoking status: Never Smoker   • Smokeless tobacco: Never Used   Substance and Sexual Activity   • Alcohol use: Yes     Comment: occ   • Drug use: No   • Sexual activity: Defer       Family History   Problem Relation Age of Onset   • Hypertension Mother    • Heart disease Father    • Diabetes Father    • Thyroid cancer Brother        Allergies   Allergen Reactions   • Compazine [Prochlorperazine Edisylate] Swelling     Tongue swelling       Current Outpatient Medications   Medication Sig Dispense Refill   • Adalimumab (Humira Pen) 40 MG/0.8ML Pen-injector Kit Inject 40 mg under the skin every other week as directed. 2 each 5   • alendronate (FOSAMAX) 70 MG tablet Take 1 tablet by mouth Every 7 (Seven) Days. 12 tablet 3   • Calcium Citrate-Vitamin D (CALCIUM + D PO) Take 1 tablet by mouth Daily.     • clobetasol (TEMOVATE) 0.05 % external solution APPLY  SOLUTION TOPICALLY TO AFFECTED AREA ONCE DAILY AS NEEDED 1 each 1   • losartan (COZAAR) 25 MG tablet Take 1.5 tablets by mouth Daily. 45 tablet 0   • montelukast (SINGULAIR) 10 MG tablet Take 10 mg by mouth Daily.       No current facility-administered medications for this visit.         LABORATORY:    Lab Results   Component  Value Date    WBC 6.31 05/15/2020    HGB 15.3 05/15/2020    HCT 44.6 05/15/2020    MCV 90.8 05/15/2020    RDW 12.5 05/15/2020     05/15/2020    NEUTRORELPCT 59.1 05/15/2020    LYMPHORELPCT 30.0 05/15/2020    MONORELPCT 7.1 05/15/2020    EOSRELPCT 2.2 05/15/2020    BASORELPCT 1.4 05/15/2020    NEUTROABS 3.73 05/15/2020    LYMPHSABS 1.89 05/15/2020       Lab Results   Component Value Date     05/15/2020    K 4.1 05/15/2020    CO2 27.1 05/15/2020     05/15/2020    BUN 14 05/15/2020    CREATININE 0.90 05/15/2020    GLUCOSE 86 12/16/2015    CALCIUM 9.8 05/15/2020    ALKPHOS 69 05/15/2020    AST 35 (H) 05/15/2020    ALT 44 (H) 05/15/2020    BILITOT 0.8 05/15/2020    ALBUMIN 4.60 05/15/2020    PROTEINTOT 7.0 12/16/2015       No results found for: LDH, URICACID     Imaging Results (Last 24 Hours)     ** No results found for the last 24 hours. **          ASSESSMENT/PLAN:    Patient Update Assessment (new medications, allergies, medical history): No udpates reported    Medication(s): Humira    Currently Taking Medication(s): As directed    Prior Authorization Status: Approved    Financial Assistance Status: None needed    Any Issues Identified: None    Appropriate to Process Prescription(s): Refill due    Counseling Offered: Declined    Next Specialty Pharmacy Visit: ~30 days

## 2021-07-26 ENCOUNTER — SPECIALTY PHARMACY (OUTPATIENT)
Dept: PHARMACY | Facility: HOSPITAL | Age: 56
End: 2021-07-26

## 2021-08-17 NOTE — PROGRESS NOTES
Specialty Pharmacy Note      Name:  Adriana Mcginnis  :  1965  Date:  2021         Past Medical History:   Diagnosis Date   • Hypertension    • Parathyroid adenoma     benign   • Psoriasis    • Thyroid nodule        Past Surgical History:   Procedure Laterality Date   • CARPAL TUNNEL RELEASE     •  SECTION     • COLONOSCOPY N/A 2017    Procedure: COLONOSCOPY WITH POLYPECTOMY (COLD BIOPSY);  Surgeon: Lillian Leal MD;  Location: [facility] ENDOSCOPY;  Service:    • PARATHYROIDECTOMY         Social History     Socioeconomic History   • Marital status:      Spouse name: Not on file   • Number of children: Not on file   • Years of education: Not on file   • Highest education level: Not on file   Tobacco Use   • Smoking status: Never Smoker   • Smokeless tobacco: Never Used   Substance and Sexual Activity   • Alcohol use: Yes     Comment: occ   • Drug use: No   • Sexual activity: Defer       Family History   Problem Relation Age of Onset   • Hypertension Mother    • Heart disease Father    • Diabetes Father    • Thyroid cancer Brother        Allergies   Allergen Reactions   • Compazine [Prochlorperazine Edisylate] Swelling     Tongue swelling       Current Outpatient Medications   Medication Sig Dispense Refill   • Adalimumab (Humira Pen) 40 MG/0.8ML Pen-injector Kit Inject 40 mg under the skin every other week as directed. 2 each 5   • alendronate (FOSAMAX) 70 MG tablet Take 1 tablet by mouth Every 7 (Seven) Days. 12 tablet 3   • Calcium Citrate-Vitamin D (CALCIUM + D PO) Take 1 tablet by mouth Daily.     • clobetasol (TEMOVATE) 0.05 % external solution APPLY  SOLUTION TOPICALLY TO AFFECTED AREA ONCE DAILY AS NEEDED 1 each 1   • losartan (COZAAR) 25 MG tablet Take 1.5 tablets by mouth Daily. 45 tablet 0   • montelukast (SINGULAIR) 10 MG tablet Take 10 mg by mouth Daily.       No current facility-administered medications for this visit.         LABORATORY:    Lab Results   Component  Value Date    WBC 6.31 05/15/2020    HGB 15.3 05/15/2020    HCT 44.6 05/15/2020    MCV 90.8 05/15/2020    RDW 12.5 05/15/2020     05/15/2020    NEUTRORELPCT 59.1 05/15/2020    LYMPHORELPCT 30.0 05/15/2020    MONORELPCT 7.1 05/15/2020    EOSRELPCT 2.2 05/15/2020    BASORELPCT 1.4 05/15/2020    NEUTROABS 3.73 05/15/2020    LYMPHSABS 1.89 05/15/2020       Lab Results   Component Value Date     05/15/2020    K 4.1 05/15/2020    CO2 27.1 05/15/2020     05/15/2020    BUN 14 05/15/2020    CREATININE 0.90 05/15/2020    GLUCOSE 86 12/16/2015    CALCIUM 9.8 05/15/2020    ALKPHOS 69 05/15/2020    AST 35 (H) 05/15/2020    ALT 44 (H) 05/15/2020    BILITOT 0.8 05/15/2020    ALBUMIN 4.60 05/15/2020    PROTEINTOT 7.0 12/16/2015       No results found for: LDH, URICACID     Imaging Results (Last 24 Hours)     ** No results found for the last 24 hours. **          ASSESSMENT/PLAN:    Patient Update Assessment (new medications, allergies, medical history): No updates reported  Medication(s): Humira    Currently Taking Medication(s): As directed    Effectiveness of Medication: Medication is working as expected    Experiencing Side Effects: None reported    Prior Authorization Status: Approved    Financial Assistance Status: None needed - $5 co-pay ([employer])    Any Issues Identified: None    Appropriate to Process Prescription(s): Refill due    Counseling Offered: Declined    Next Specialty Pharmacy Visit: ~30 days

## 2021-08-18 ENCOUNTER — SPECIALTY PHARMACY (OUTPATIENT)
Dept: PHARMACY | Facility: HOSPITAL | Age: 56
End: 2021-08-18

## 2021-09-13 ENCOUNTER — SPECIALTY PHARMACY (OUTPATIENT)
Dept: PHARMACY | Facility: HOSPITAL | Age: 56
End: 2021-09-13

## 2021-09-22 NOTE — PROGRESS NOTES
Specialty Pharmacy Note      Name:  Adriana Mcginnis  :  1965  Date:  2021         Past Medical History:   Diagnosis Date   • Hypertension    • Parathyroid adenoma     benign   • Psoriasis    • Thyroid nodule        Past Surgical History:   Procedure Laterality Date   • CARPAL TUNNEL RELEASE     •  SECTION     • COLONOSCOPY N/A 2017    Procedure: COLONOSCOPY WITH POLYPECTOMY (COLD BIOPSY);  Surgeon: Lillian Leal MD;  Location: [facility] ENDOSCOPY;  Service:    • PARATHYROIDECTOMY         Social History     Socioeconomic History   • Marital status:      Spouse name: Not on file   • Number of children: Not on file   • Years of education: Not on file   • Highest education level: Not on file   Tobacco Use   • Smoking status: Never Smoker   • Smokeless tobacco: Never Used   Substance and Sexual Activity   • Alcohol use: Yes     Comment: occ   • Drug use: No   • Sexual activity: Defer       Family History   Problem Relation Age of Onset   • Hypertension Mother    • Heart disease Father    • Diabetes Father    • Thyroid cancer Brother        Allergies   Allergen Reactions   • Compazine [Prochlorperazine Edisylate] Swelling     Tongue swelling       Current Outpatient Medications   Medication Sig Dispense Refill   • Adalimumab (Humira Pen) 40 MG/0.8ML Pen-injector Kit Inject 40 mg under the skin every other week as directed. 2 each 2   • alendronate (FOSAMAX) 70 MG tablet Take 1 tablet by mouth Every 7 (Seven) Days. 12 tablet 3   • Calcium Citrate-Vitamin D (CALCIUM + D PO) Take 1 tablet by mouth Daily.     • clobetasol (TEMOVATE) 0.05 % external solution APPLY  SOLUTION TOPICALLY TO AFFECTED AREA ONCE DAILY AS NEEDED 1 each 1   • losartan (COZAAR) 25 MG tablet Take 1.5 tablets by mouth Daily. 45 tablet 0   • montelukast (SINGULAIR) 10 MG tablet Take 10 mg by mouth Daily.     • montelukast (SINGULAIR) 10 MG tablet Take 1 tablet by mouth Daily As Needed for allergies 90 tablet 0      No current facility-administered medications for this visit.         LABORATORY:    Lab Results   Component Value Date    WBC 6.31 05/15/2020    HGB 15.3 05/15/2020    HCT 44.6 05/15/2020    MCV 90.8 05/15/2020    RDW 12.5 05/15/2020     05/15/2020    NEUTRORELPCT 59.1 05/15/2020    LYMPHORELPCT 30.0 05/15/2020    MONORELPCT 7.1 05/15/2020    EOSRELPCT 2.2 05/15/2020    BASORELPCT 1.4 05/15/2020    NEUTROABS 3.73 05/15/2020    LYMPHSABS 1.89 05/15/2020       Lab Results   Component Value Date     05/15/2020    K 4.1 05/15/2020    CO2 27.1 05/15/2020     05/15/2020    BUN 14 05/15/2020    CREATININE 0.90 05/15/2020    GLUCOSE 86 12/16/2015    CALCIUM 9.8 05/15/2020    ALKPHOS 69 05/15/2020    AST 35 (H) 05/15/2020    ALT 44 (H) 05/15/2020    BILITOT 0.8 05/15/2020    ALBUMIN 4.60 05/15/2020    PROTEINTOT 7.0 12/16/2015       No results found for: LDH, URICACID     Imaging Results (Last 24 Hours)     ** No results found for the last 24 hours. **          ASSESSMENT/PLAN:    Patient Update Assessment (new medications, allergies, medical history): No updates reported    Medication(s): Humira 40 Mg/0.8 mL Pen Injector Kit    Currently Taking Medication(s): Patient is currently injecting 40 mg under the skin every other week as directed.    Effectiveness of Medication: Effective    Experiencing Side Effects: None reported at this time.    Prior Authorization Status: Approved    Financial Assistance Status: None needed at this time -  $5 patient co-pay    Any Issues Identified: None    Appropriate to Process Prescription(s): Refill due    Counseling Offered: Declined    Next Specialty Pharmacy Visit: ~28 days

## 2021-09-29 ENCOUNTER — SPECIALTY PHARMACY (OUTPATIENT)
Dept: PHARMACY | Facility: HOSPITAL | Age: 56
End: 2021-09-29

## 2021-11-03 ENCOUNTER — SPECIALTY PHARMACY (OUTPATIENT)
Dept: PHARMACY | Facility: HOSPITAL | Age: 56
End: 2021-11-03

## 2021-11-08 ENCOUNTER — SPECIALTY PHARMACY (OUTPATIENT)
Dept: PHARMACY | Facility: HOSPITAL | Age: 56
End: 2021-11-08

## 2021-11-10 NOTE — PROGRESS NOTES
Specialty Pharmacy Note      Name:  Adriana Mcginins  :  1965  Date:  11/10/2021         Past Medical History:   Diagnosis Date   • Hypertension    • Parathyroid adenoma     benign   • Psoriasis    • Thyroid nodule        Past Surgical History:   Procedure Laterality Date   • CARPAL TUNNEL RELEASE     •  SECTION     • COLONOSCOPY N/A 2017    Procedure: COLONOSCOPY WITH POLYPECTOMY (COLD BIOPSY);  Surgeon: Lillian Leal MD;  Location: [facility] ENDOSCOPY;  Service:    • PARATHYROIDECTOMY         Social History     Socioeconomic History   • Marital status:    Tobacco Use   • Smoking status: Never Smoker   • Smokeless tobacco: Never Used   Substance and Sexual Activity   • Alcohol use: Yes     Comment: occ   • Drug use: No   • Sexual activity: Defer       Family History   Problem Relation Age of Onset   • Hypertension Mother    • Heart disease Father    • Diabetes Father    • Thyroid cancer Brother        Allergies   Allergen Reactions   • Compazine [Prochlorperazine Edisylate] Swelling     Tongue swelling       Current Outpatient Medications   Medication Sig Dispense Refill   • Adalimumab (Humira Pen) 40 MG/0.8ML Pen-injector Kit Inject 40 mg under the skin every other week as directed. 2 each 2   • adalimumab (HUMIRA) 40 MG/0.8ML Prefilled Syringe Kit injection Inject 40mg (0.8 mL) under the skin into the appropriate area as directed Every 14 (Fourteen) Days. 2 each 5   • alendronate (FOSAMAX) 70 MG tablet Take 1 tablet by mouth Every 7 (Seven) Days. 12 tablet 3   • Calcium Citrate-Vitamin D (CALCIUM + D PO) Take 1 tablet by mouth Daily.     • clobetasol (TEMOVATE) 0.05 % external solution APPLY  SOLUTION TOPICALLY TO AFFECTED AREA ONCE DAILY AS NEEDED 1 each 1   • losartan (COZAAR) 25 MG tablet Take 1.5 tablets by mouth Daily. 45 tablet 0   • montelukast (SINGULAIR) 10 MG tablet Take 10 mg by mouth Daily.     • montelukast (SINGULAIR) 10 MG tablet Take 1 tablet by mouth Daily As  Needed for allergies 90 tablet 0     No current facility-administered medications for this visit.         LABORATORY:    Lab Results   Component Value Date    WBC 6.31 05/15/2020    HGB 15.3 05/15/2020    HCT 44.6 05/15/2020    MCV 90.8 05/15/2020    RDW 12.5 05/15/2020     05/15/2020    NEUTRORELPCT 59.1 05/15/2020    LYMPHORELPCT 30.0 05/15/2020    MONORELPCT 7.1 05/15/2020    EOSRELPCT 2.2 05/15/2020    BASORELPCT 1.4 05/15/2020    NEUTROABS 3.73 05/15/2020    LYMPHSABS 1.89 05/15/2020       Lab Results   Component Value Date     05/15/2020    K 4.1 05/15/2020    CO2 27.1 05/15/2020     05/15/2020    BUN 14 05/15/2020    CREATININE 0.90 05/15/2020    GLUCOSE 83 05/15/2020    CALCIUM 9.8 05/15/2020    ALKPHOS 69 05/15/2020    AST 35 (H) 05/15/2020    ALT 44 (H) 05/15/2020    BILITOT 0.8 05/15/2020    ALBUMIN 4.60 05/15/2020    PROTEINTOT 7.0 12/16/2015       No results found for: LDH, URICACID     Imaging Results (Last 24 Hours)     ** No results found for the last 24 hours. **          ASSESSMENT/PLAN:    Patient Update Assessment (new medications, allergies, medical history): No updates reported at this time.    Medication(s): Humira 40 mg/0.8ml pen-injector    Currently Taking Medication(s): Patient is currently taking medication as prescribed.    Effectiveness of Medication: Effective    Experiencing Side Effects: None reported at this time.    Prior Authorization Status: Approved    Financial Assistance Status: None needed at this time.    Any Issues Identified: None    Appropriate to Process Prescription(s): Yes, medication refill is due.    Counseling Offered: Declined    Next Specialty Pharmacy Visit: ~28 days                Specialty Pharmacy Refill Coordination Note     Adriana is a 56 y.o. female contacted today regarding refills of  one specialty medication(s).    Reviewed and verified with patient: Allergies  Meds  Problems       Specialty medication(s) and dose(s) confirmed:  yes    Refill Questions      Most Recent Value   Changes to allergies? No   Changes to medications? No   New conditions since last clinic visit No   Unplanned office visit, urgent care, ED, or hospital admission in the last 4 weeks  No   How does patient/caregiver feel medication is working? Very good   Financial problems or insurance changes  No          Delivery Questions      Most Recent Value   Delivery method FedEx   Delivery address correct? Yes   Preferred delivery time? Anytime   Is there any medication that is due not being filled? No   Supplies needed? Alcohol swabs   Cooler needed? Yes   Do any medications need mixed or dated? No   Questions or concerns for the pharmacist? No   Are any medications first time fills? No            Medication Adherence    Any gaps in refill history greater than 2 weeks in the last 3 months: no  Demonstrates understanding of importance of adherence: yes  Informant: patient  Reliability of informant: reliable  Provider-estimated medication adherence level: %  Reasons for non-adherence: no problems identified          Follow-up: 28 day(s)     Carlton Delgado, Pharmacy Technician  Specialty Pharmacy Technician

## 2021-11-29 ENCOUNTER — TELEPHONE (OUTPATIENT)
Dept: FAMILY MEDICINE CLINIC | Facility: CLINIC | Age: 56
End: 2021-11-29

## 2021-11-29 DIAGNOSIS — R79.89 LOW VITAMIN D LEVEL: Primary | ICD-10-CM

## 2021-11-29 DIAGNOSIS — I10 ESSENTIAL HYPERTENSION: ICD-10-CM

## 2021-12-01 LAB
25(OH)D3+25(OH)D2 SERPL-MCNC: 20.4 NG/ML (ref 30–100)
ALBUMIN SERPL-MCNC: 4.2 G/DL (ref 3.8–4.9)
ALBUMIN/GLOB SERPL: 1.7 {RATIO} (ref 1.2–2.2)
ALP SERPL-CCNC: 73 IU/L (ref 44–121)
ALT SERPL-CCNC: 50 IU/L (ref 0–32)
AST SERPL-CCNC: 44 IU/L (ref 0–40)
BASOPHILS # BLD AUTO: 0.1 X10E3/UL (ref 0–0.2)
BASOPHILS NFR BLD AUTO: 1 %
BILIRUB SERPL-MCNC: 0.6 MG/DL (ref 0–1.2)
BUN SERPL-MCNC: 12 MG/DL (ref 6–24)
BUN/CREAT SERPL: 14 (ref 9–23)
CALCIUM SERPL-MCNC: 9.4 MG/DL (ref 8.7–10.2)
CHLORIDE SERPL-SCNC: 103 MMOL/L (ref 96–106)
CHOLEST SERPL-MCNC: 191 MG/DL (ref 100–199)
CO2 SERPL-SCNC: 24 MMOL/L (ref 20–29)
CREAT SERPL-MCNC: 0.87 MG/DL (ref 0.57–1)
EOSINOPHIL # BLD AUTO: 0.2 X10E3/UL (ref 0–0.4)
EOSINOPHIL NFR BLD AUTO: 3 %
ERYTHROCYTE [DISTWIDTH] IN BLOOD BY AUTOMATED COUNT: 12.4 % (ref 11.7–15.4)
GLOBULIN SER CALC-MCNC: 2.5 G/DL (ref 1.5–4.5)
GLUCOSE SERPL-MCNC: 82 MG/DL (ref 65–99)
HCT VFR BLD AUTO: 42.8 % (ref 34–46.6)
HDLC SERPL-MCNC: 49 MG/DL
HGB BLD-MCNC: 14.7 G/DL (ref 11.1–15.9)
IMM GRANULOCYTES # BLD AUTO: 0 X10E3/UL (ref 0–0.1)
IMM GRANULOCYTES NFR BLD AUTO: 0 %
LDLC SERPL CALC-MCNC: 116 MG/DL (ref 0–99)
LYMPHOCYTES # BLD AUTO: 1.7 X10E3/UL (ref 0.7–3.1)
LYMPHOCYTES NFR BLD AUTO: 32 %
MCH RBC QN AUTO: 30.9 PG (ref 26.6–33)
MCHC RBC AUTO-ENTMCNC: 34.3 G/DL (ref 31.5–35.7)
MCV RBC AUTO: 90 FL (ref 79–97)
MONOCYTES # BLD AUTO: 0.4 X10E3/UL (ref 0.1–0.9)
MONOCYTES NFR BLD AUTO: 7 %
NEUTROPHILS # BLD AUTO: 3.1 X10E3/UL (ref 1.4–7)
NEUTROPHILS NFR BLD AUTO: 57 %
PLATELET # BLD AUTO: 213 X10E3/UL (ref 150–450)
POTASSIUM SERPL-SCNC: 4.3 MMOL/L (ref 3.5–5.2)
PROT SERPL-MCNC: 6.7 G/DL (ref 6–8.5)
RBC # BLD AUTO: 4.75 X10E6/UL (ref 3.77–5.28)
SODIUM SERPL-SCNC: 139 MMOL/L (ref 134–144)
TRIGL SERPL-MCNC: 145 MG/DL (ref 0–149)
TSH SERPL DL<=0.005 MIU/L-ACNC: 1.93 UIU/ML (ref 0.45–4.5)
VLDLC SERPL CALC-MCNC: 26 MG/DL (ref 5–40)
WBC # BLD AUTO: 5.5 X10E3/UL (ref 3.4–10.8)

## 2021-12-08 ENCOUNTER — OFFICE VISIT (OUTPATIENT)
Dept: FAMILY MEDICINE CLINIC | Facility: CLINIC | Age: 56
End: 2021-12-08

## 2021-12-08 VITALS
HEART RATE: 69 BPM | RESPIRATION RATE: 16 BRPM | HEIGHT: 70 IN | DIASTOLIC BLOOD PRESSURE: 80 MMHG | WEIGHT: 188.6 LBS | BODY MASS INDEX: 27 KG/M2 | SYSTOLIC BLOOD PRESSURE: 108 MMHG | TEMPERATURE: 97.5 F | OXYGEN SATURATION: 98 %

## 2021-12-08 DIAGNOSIS — E89.2 STATUS POST PARATHYROIDECTOMY (HCC): ICD-10-CM

## 2021-12-08 DIAGNOSIS — L40.9 PSORIASIS: ICD-10-CM

## 2021-12-08 DIAGNOSIS — R79.89 LOW VITAMIN D LEVEL: ICD-10-CM

## 2021-12-08 DIAGNOSIS — I10 ESSENTIAL HYPERTENSION: Primary | ICD-10-CM

## 2021-12-08 DIAGNOSIS — E04.1 THYROID NODULE: ICD-10-CM

## 2021-12-08 DIAGNOSIS — E55.9 VITAMIN D DEFICIENCY: ICD-10-CM

## 2021-12-08 DIAGNOSIS — M85.80 OSTEOPENIA, UNSPECIFIED LOCATION: ICD-10-CM

## 2021-12-08 DIAGNOSIS — Z78.0 POST-MENOPAUSAL: ICD-10-CM

## 2021-12-08 DIAGNOSIS — R79.89 LFT ELEVATION: ICD-10-CM

## 2021-12-08 DIAGNOSIS — E78.2 HYPERLIPIDEMIA, MIXED: ICD-10-CM

## 2021-12-08 PROCEDURE — 99214 OFFICE O/P EST MOD 30 MIN: CPT | Performed by: PHYSICIAN ASSISTANT

## 2021-12-08 RX ORDER — LOSARTAN POTASSIUM 25 MG/1
37.5 TABLET ORAL DAILY
Qty: 135 TABLET | Refills: 3 | Status: SHIPPED | OUTPATIENT
Start: 2021-12-08 | End: 2022-12-07

## 2021-12-08 NOTE — PROGRESS NOTES
"Virginia Mcginnis is a 56 y.o. female.     History of Present Illness    Since the last visit, she has overall felt well.  She has Essential Hypertension and well controlled on current medication, Hyperlipidemia and working on this with diet and exercise and Vitamin D deficiency and will update labs for continued management.  she has been compliant with current medications have reviewed them.  The patient denies medication side effects.  Will refill medications. /65 (BP Location: Left arm, Patient Position: Sitting, Cuff Size: Adult)   Pulse 69   Temp 97.5 °F (36.4 °C)   Resp 16   Ht 177.8 cm (70\")   Wt 85.5 kg (188 lb 9.6 oz)   SpO2 98%   BMI 27.06 kg/m²   Very active---cardio several times a week  Results for orders placed or performed in visit on 11/29/21   Comprehensive Metabolic Panel    Specimen: Blood   Result Value Ref Range    Glucose 82 65 - 99 mg/dL    BUN 12 6 - 24 mg/dL    Creatinine 0.87 0.57 - 1.00 mg/dL    eGFR Non African Am 75 >59 mL/min/1.73    eGFR African Am 87 >59 mL/min/1.73    BUN/Creatinine Ratio 14 9 - 23    Sodium 139 134 - 144 mmol/L    Potassium 4.3 3.5 - 5.2 mmol/L    Chloride 103 96 - 106 mmol/L    Total CO2 24 20 - 29 mmol/L    Calcium 9.4 8.7 - 10.2 mg/dL    Total Protein 6.7 6.0 - 8.5 g/dL    Albumin 4.2 3.8 - 4.9 g/dL    Globulin 2.5 1.5 - 4.5 g/dL    A/G Ratio 1.7 1.2 - 2.2    Total Bilirubin 0.6 0.0 - 1.2 mg/dL    Alkaline Phosphatase 73 44 - 121 IU/L    AST (SGOT) 44 (H) 0 - 40 IU/L    ALT (SGPT) 50 (H) 0 - 32 IU/L   Lipid Panel    Specimen: Blood   Result Value Ref Range    Total Cholesterol 191 100 - 199 mg/dL    Triglycerides 145 0 - 149 mg/dL    HDL Cholesterol 49 >39 mg/dL    VLDL Cholesterol Juan 26 5 - 40 mg/dL    LDL Chol Calc (NIH) 116 (H) 0 - 99 mg/dL   TSH    Specimen: Blood   Result Value Ref Range    TSH 1.930 0.450 - 4.500 uIU/mL   Vitamin D 25 hydroxy    Specimen: Blood   Result Value Ref Range    25 Hydroxy, Vitamin D 20.4 (L) 30.0 - 100.0 " ng/mL   CBC & Differential    Specimen: Blood   Result Value Ref Range    WBC 5.5 3.4 - 10.8 x10E3/uL    RBC 4.75 3.77 - 5.28 x10E6/uL    Hemoglobin 14.7 11.1 - 15.9 g/dL    Hematocrit 42.8 34.0 - 46.6 %    MCV 90 79 - 97 fL    MCH 30.9 26.6 - 33.0 pg    MCHC 34.3 31.5 - 35.7 g/dL    RDW 12.4 11.7 - 15.4 %    Platelets 213 150 - 450 x10E3/uL    Neutrophil Rel % 57 Not Estab. %    Lymphocyte Rel % 32 Not Estab. %    Monocyte Rel % 7 Not Estab. %    Eosinophil Rel % 3 Not Estab. %    Basophil Rel % 1 Not Estab. %    Neutrophils Absolute 3.1 1.4 - 7.0 x10E3/uL    Lymphocytes Absolute 1.7 0.7 - 3.1 x10E3/uL    Monocytes Absolute 0.4 0.1 - 0.9 x10E3/uL    Eosinophils Absolute 0.2 0.0 - 0.4 x10E3/uL    Basophils Absolute 0.1 0.0 - 0.2 x10E3/uL    Immature Granulocyte Rel % 0 Not Estab. %    Immature Grans Absolute 0.0 0.0 - 0.1 x10E3/uL         Also no history of elevated LFTs with liver ultrasound completed 2/13/2017 with note of normal.  I also see him notes from dermatology about her liver enzymes being elevated and stopped Humira. Hepatitis neg  Do want a note in the past that patient had been on amlodipine and Dr. Foreman, dermatologist wanted her to change medication due to her psoriasis.  Has seen Rheumatologist---not in joints. Has seen DR Buckner  GYN---  She has also been on Fosamax in the past for osteopenia treatment  Only took few mos  Also reviewed labs from 2016 Dr. Camejo.  This was a follow-up visit from parathyroid ectomy x1 and note thyroid nodule stable to follow-up in 6 months.  The following portions of the patient's history were reviewed and updated as appropriate: allergies, current medications, past family history, past medical history, past social history, past surgical history and problem list.    Review of Systems   Constitutional: Negative for activity change, appetite change and unexpected weight change.   HENT: Negative for nosebleeds and trouble swallowing.    Eyes: Negative for pain and  visual disturbance.   Respiratory: Negative for chest tightness, shortness of breath and wheezing.    Cardiovascular: Negative for chest pain and palpitations.   Gastrointestinal: Negative for abdominal pain and blood in stool.   Endocrine: Negative.    Genitourinary: Negative for difficulty urinating and hematuria.   Musculoskeletal: Negative for joint swelling.   Skin: Negative for color change and rash.   Allergic/Immunologic: Negative.    Neurological: Negative for syncope and speech difficulty.   Hematological: Negative for adenopathy.   Psychiatric/Behavioral: Negative for agitation and confusion.   All other systems reviewed and are negative.      Objective   Physical Exam  Vitals and nursing note reviewed.   Constitutional:       General: She is not in acute distress.     Appearance: She is well-developed. She is not ill-appearing or toxic-appearing.   HENT:      Head: Normocephalic.      Right Ear: External ear normal.      Left Ear: External ear normal.      Nose: Nose normal.      Mouth/Throat:      Pharynx: Oropharynx is clear.   Eyes:      General: No scleral icterus.     Conjunctiva/sclera: Conjunctivae normal.      Pupils: Pupils are equal, round, and reactive to light.   Neck:      Thyroid: No thyromegaly.   Cardiovascular:      Rate and Rhythm: Normal rate and regular rhythm.      Heart sounds: Normal heart sounds. No murmur heard.      Pulmonary:      Effort: Pulmonary effort is normal. No respiratory distress.      Breath sounds: Normal breath sounds.   Musculoskeletal:         General: No deformity. Normal range of motion.      Cervical back: Normal range of motion and neck supple.      Right lower leg: No edema.      Left lower leg: No edema.   Skin:     General: Skin is warm and dry.      Coloration: Skin is not jaundiced.      Findings: No rash.   Neurological:      General: No focal deficit present.      Mental Status: She is alert and oriented to person, place, and time. Mental status is at  baseline.   Psychiatric:         Mood and Affect: Mood normal.         Behavior: Behavior normal.         Thought Content: Thought content normal.         Judgment: Judgment normal.         Assessment/Plan   Diagnoses and all orders for this visit:    1. Essential hypertension (Primary)    2. Psoriasis    3. Osteopenia, unspecified location    4. Low vitamin D level    5. Post-menopausal  -     DEXA Bone Density Axial; Future    6. Vitamin D deficiency    7. Hyperlipidemia, mixed    8. Thyroid nodule  -     Ambulatory Referral to ENT (Otolaryngology)        Need her f/u thyroid nodule-----see Dr Nell Alba, Adriana Mcginnis, was seen today.  she was seen for HTN and continue medication, Hyperlipidemia and will work on this with diet and exercise and Vitamin D deficiency and supplemented.    GYN to do mammo  LFT elevation from Humira--psoriasis   Derm Dr Foreman  See ENT f/u thyroid

## 2021-12-16 ENCOUNTER — APPOINTMENT (OUTPATIENT)
Dept: WOMENS IMAGING | Facility: HOSPITAL | Age: 56
End: 2021-12-16

## 2021-12-16 PROCEDURE — 77063 BREAST TOMOSYNTHESIS BI: CPT | Performed by: RADIOLOGY

## 2021-12-16 PROCEDURE — 77067 SCR MAMMO BI INCL CAD: CPT | Performed by: RADIOLOGY

## 2021-12-18 LAB
25(OH)D3+25(OH)D2 SERPL-MCNC: 29.4 NG/ML (ref 30–100)
ALBUMIN SERPL-MCNC: 4.6 G/DL (ref 3.8–4.9)
ALBUMIN/GLOB SERPL: 1.9 {RATIO} (ref 1.2–2.2)
ALP SERPL-CCNC: 77 IU/L (ref 44–121)
ALT SERPL-CCNC: 45 IU/L (ref 0–32)
AST SERPL-CCNC: 39 IU/L (ref 0–40)
BASOPHILS # BLD AUTO: 0.1 X10E3/UL (ref 0–0.2)
BASOPHILS NFR BLD AUTO: 1 %
BILIRUB SERPL-MCNC: 0.7 MG/DL (ref 0–1.2)
BUN SERPL-MCNC: 13 MG/DL (ref 6–24)
BUN/CREAT SERPL: 17 (ref 9–23)
CA-I SERPL ISE-MCNC: 5.4 MG/DL (ref 4.5–5.6)
CALCIUM SERPL-MCNC: 9.7 MG/DL (ref 8.7–10.2)
CHLORIDE SERPL-SCNC: 100 MMOL/L (ref 96–106)
CHOLEST SERPL-MCNC: 183 MG/DL (ref 100–199)
CO2 SERPL-SCNC: 25 MMOL/L (ref 20–29)
CREAT SERPL-MCNC: 0.78 MG/DL (ref 0.57–1)
EOSINOPHIL # BLD AUTO: 0.2 X10E3/UL (ref 0–0.4)
EOSINOPHIL NFR BLD AUTO: 2 %
ERYTHROCYTE [DISTWIDTH] IN BLOOD BY AUTOMATED COUNT: 12.4 % (ref 11.7–15.4)
FOLATE SERPL-MCNC: 11.7 NG/ML
GLOBULIN SER CALC-MCNC: 2.4 G/DL (ref 1.5–4.5)
GLUCOSE SERPL-MCNC: 81 MG/DL (ref 65–99)
HBA1C MFR BLD: 5.4 % (ref 4.8–5.6)
HCT VFR BLD AUTO: 42.5 % (ref 34–46.6)
HDLC SERPL-MCNC: 50 MG/DL
HGB BLD-MCNC: 15.2 G/DL (ref 11.1–15.9)
IMM GRANULOCYTES # BLD AUTO: 0 X10E3/UL (ref 0–0.1)
IMM GRANULOCYTES NFR BLD AUTO: 0 %
LDLC SERPL CALC-MCNC: 106 MG/DL (ref 0–99)
LYMPHOCYTES # BLD AUTO: 2 X10E3/UL (ref 0.7–3.1)
LYMPHOCYTES NFR BLD AUTO: 28 %
MCH RBC QN AUTO: 31.8 PG (ref 26.6–33)
MCHC RBC AUTO-ENTMCNC: 35.8 G/DL (ref 31.5–35.7)
MCV RBC AUTO: 89 FL (ref 79–97)
MONOCYTES # BLD AUTO: 0.5 X10E3/UL (ref 0.1–0.9)
MONOCYTES NFR BLD AUTO: 7 %
NEUTROPHILS # BLD AUTO: 4.3 X10E3/UL (ref 1.4–7)
NEUTROPHILS NFR BLD AUTO: 62 %
PLATELET # BLD AUTO: 220 X10E3/UL (ref 150–450)
POTASSIUM SERPL-SCNC: 4.4 MMOL/L (ref 3.5–5.2)
PROT SERPL-MCNC: 7 G/DL (ref 6–8.5)
PTH-INTACT SERPL-MCNC: 53 PG/ML (ref 15–65)
RBC # BLD AUTO: 4.78 X10E6/UL (ref 3.77–5.28)
SODIUM SERPL-SCNC: 139 MMOL/L (ref 134–144)
T3FREE SERPL-MCNC: 3.3 PG/ML (ref 2–4.4)
T4 FREE SERPL-MCNC: 1.01 NG/DL (ref 0.82–1.77)
TRIGL SERPL-MCNC: 155 MG/DL (ref 0–149)
TSH SERPL DL<=0.005 MIU/L-ACNC: 2.07 UIU/ML (ref 0.45–4.5)
VIT B12 SERPL-MCNC: 462 PG/ML (ref 232–1245)
VLDLC SERPL CALC-MCNC: 27 MG/DL (ref 5–40)
WBC # BLD AUTO: 7 X10E3/UL (ref 3.4–10.8)

## 2022-01-03 ENCOUNTER — SPECIALTY PHARMACY (OUTPATIENT)
Dept: PHARMACY | Facility: HOSPITAL | Age: 57
End: 2022-01-03

## 2022-01-05 ENCOUNTER — SPECIALTY PHARMACY (OUTPATIENT)
Dept: PHARMACY | Facility: TELEHEALTH | Age: 57
End: 2022-01-05

## 2022-01-05 NOTE — PROGRESS NOTES
Specialty Pharmacy Refill Coordination Note     Adriana is a 56 y.o. female contacted today regarding refills of  Humira specialty medication(s).    Reviewed and verified with patient:  Allergies       Specialty medication(s) and dose(s) confirmed: yes    Refill Questions      Most Recent Value   Changes to allergies? No   Changes to medications? No   New conditions since last clinic visit No   Unplanned office visit, urgent care, ED, or hospital admission in the last 4 weeks  No   How does patient/caregiver feel medication is working? Very good   Financial problems or insurance changes  No   How many doses of your specialty medications were missed in the last 4 weeks? 0   Does this patient require a clinical escalation to a pharmacist? No          Delivery Questions      Most Recent Value   Delivery method FedEx  [standard, no sig required, ship 1/13/22 , delivery 1/14/22]   Delivery address correct? Yes   Delivery phone number [phone]   Preferred delivery time? Anytime   Number of medications in delivery 1   Medication being filled and delivered Humira   Is there any medication that is due not being filled? No   Cooler needed? No   Do any medications need mixed or dated? No   Questions or concerns for the pharmacist? No   Are any medications first time fills? No                 Follow-up: 3 week(s)     Benedicto Rodriguez, Pharmacy Technician  Specialty Pharmacy Technician

## 2022-01-20 ENCOUNTER — HOSPITAL ENCOUNTER (OUTPATIENT)
Dept: ULTRASOUND IMAGING | Facility: HOSPITAL | Age: 57
Discharge: HOME | End: 2022-01-20
Admitting: PHYSICIAN ASSISTANT

## 2022-01-20 DIAGNOSIS — R79.89 LFT ELEVATION: ICD-10-CM

## 2022-01-20 DIAGNOSIS — R79.89 LFT ELEVATION: Primary | ICD-10-CM

## 2022-01-20 PROCEDURE — 76705 ECHO EXAM OF ABDOMEN: CPT

## 2022-02-02 ENCOUNTER — SPECIALTY PHARMACY (OUTPATIENT)
Dept: PHARMACY | Facility: TELEHEALTH | Age: 57
End: 2022-02-02

## 2022-02-04 NOTE — PROGRESS NOTES
Specialty Pharmacy Refill Coordination Note     Adriana is a 56 y.o. female contacted today regarding refills of  Humira specialty medication(s).    Reviewed and verified with patient:  Allergies  Meds       Specialty medication(s) and dose(s) confirmed: yes    Refill Questions      Most Recent Value   Changes to allergies? No   Changes to medications? No   New conditions since last clinic visit No   Unplanned office visit, urgent care, ED, or hospital admission in the last 4 weeks  No   How does patient/caregiver feel medication is working? Good   Financial problems or insurance changes  No   If yes, describe changes in insurance or financial issues. na   How many doses of your specialty medications were missed in the last 4 weeks? 0   Why were doses missed? na   Does this patient require a clinical escalation to a pharmacist? No          Delivery Questions      Most Recent Value   Delivery method [employer]  [standard, no sig required, ship 2/7/22, delivery 2/8/22]   Delivery address correct? Yes   Preferred delivery time? Anytime   Number of medications in delivery 1   Medication being filled and delivered Humira   Doses left of specialty medications 0   Is there any medication that is due not being filled? No   Supplies needed? Sharps container   Questions or concerns for the pharmacist? No   Are any medications first time fills? No                 Follow-up: 26 day(s)     Benedicto Rodriguez, Pharmacy Technician  Specialty Pharmacy Technician

## 2022-02-04 NOTE — PROGRESS NOTES
Specialty Pharmacy Patient Management Program  Reassessment     Adriana Shepherd is a 56 y.o. female with Psoriasis and enrolled in the Patient Management program offered by [facility].  A follow-up outreach was conducted, including assessment of continued therapy appropriateness, medication adherence, and side effect incidence and management for Humira.     Changes to Insurance Coverage or Financial Support  [employer] insurance through end of February, then will be changing to new plan. Sent patient information on Humira Complete program if needed.     Relevant Past Medical History and Comorbidities  Relevant medical history and concomitant health conditions were discussed with the patient. The patient's chart has been reviewed for relevant past medical history and comorbid health conditions and updated as necessary.   Past Medical History:   Diagnosis Date   • Hypertension    • Parathyroid adenoma     benign   • Psoriasis    • Thyroid nodule      Social History     Socioeconomic History   • Marital status:    Tobacco Use   • Smoking status: Never Smoker   • Smokeless tobacco: Never Used   Substance and Sexual Activity   • Alcohol use: Yes     Comment: occ   • Drug use: No   • Sexual activity: Defer       Allergies  Known allergies and reactions were discussed with the patient. The patient's chart has been reviewed for allergy information and updated as necessary.   Compazine [prochlorperazine edisylate]    Relevant Laboratory Values  No results for input(s): CMP, BMP, CBC in the last 72 hours.    Current Medication List  This medication list has been reviewed with the patient and evaluated for any interactions or necessary modifications/recommendations, and updated to include all prescription medications, OTC medications, and supplements the patient is currently taking.  This list reflects what is contained in the patient's profile, which has also been marked as reviewed to communicate  to other providers it is the most up to date version of the patient's current medication therapy.     Current Outpatient Medications:   •  adalimumab (HUMIRA) 40 MG/0.8ML Prefilled Syringe Kit injection, Inject 40mg (0.8 mL) under the skin into the appropriate area as directed Every 14 (Fourteen) Days., Disp: 2 each, Rfl: 5  •  clobetasol (TEMOVATE) 0.05 % external solution, APPLY  SOLUTION TOPICALLY TO AFFECTED AREA ONCE DAILY AS NEEDED, Disp: 1 each, Rfl: 1  •  losartan (COZAAR) 25 MG tablet, Take 1.5 tablets by mouth Daily. For BP, Disp: 135 tablet, Rfl: 3  •  vitamin D3 125 MCG (5000 UT) capsule capsule, Take 5,000 Units by mouth Daily., Disp: , Rfl:     Drug Interactions  None     Adverse Drug Reactions  • Adverse Reactions Experienced: None   • Plan for ADR Management: not required     Hospitalizations and Urgent Care Since Last Assessment  • Hospitalizations or Admissions: None   • ED Visits: None   • Urgent Office Visits: None      Adherence and Self-Administration  • Approximate Number of Doses Missed Since Last Assessment: None     • Ongoing or New Barriers to Patient Adherence and/or Self-Administration: None    • Methods for Supporting Patient Adherence and/or Self-Administration: none required     Goals of Therapy  Progress Toward Meeting Patient-Identified Goals of Therapy: On Track  o New Patient-Identified Goals, If Applicable:     • Progress Toward Meeting Clinical Goals or Therapeutic Targets: On Track  o New Clinical Goals or Therapeutic Targets, If Applicable:     Quality of Life Assessment   Quality of Life related to the patient's specialty therapy was discussed with the patient. The QOL segment of this outreach has been reviewed and updated.   • Quality of Life Score: 5    Reassessment Plan & Follow-Up  1. Medication Therapy Changes: vitamin d 125 mg or 5000 units daily (stopped calcium/vit d combo)  2. Additional Plans, Therapy Recommendations, or Therapy Problems to Be Addressed: none    3. Pharmacist to perform regular reassessments no more than (6) months from the previous assessment.  4. Care Coordinator to set up future refill outreaches, coordinate prescription delivery, and escalate clinical questions to pharmacist.     Attestation  I attest that the specialty medication(s) addressed above are appropriate for the patient based on my reassessment.  If the prescribed therapy is at any point deemed not appropriate based on the current or future assessments, a consultation will be initiated with the patient's specialty care provider to determine the best course of action. The revised plan of therapy will be documented along with any additional patient education provided.     Electronically signed by Jackie Camargo, Brice, 02/04/22, 9:36 AM EST.

## 2022-02-08 ENCOUNTER — SPECIALTY PHARMACY (OUTPATIENT)
Dept: PHARMACY | Facility: TELEHEALTH | Age: 57
End: 2022-02-08

## 2022-02-22 ENCOUNTER — SPECIALTY PHARMACY (OUTPATIENT)
Dept: PHARMACY | Facility: TELEHEALTH | Age: 57
End: 2022-02-22

## 2022-03-16 ENCOUNTER — SPECIALTY PHARMACY (OUTPATIENT)
Dept: PHARMACY | Facility: TELEHEALTH | Age: 57
End: 2022-03-16

## 2022-03-17 ENCOUNTER — SPECIALTY PHARMACY (OUTPATIENT)
Dept: PHARMACY | Facility: TELEHEALTH | Age: 57
End: 2022-03-17

## 2022-04-06 ENCOUNTER — DOCUMENTATION (OUTPATIENT)
Dept: PHARMACY | Facility: TELEHEALTH | Age: 57
End: 2022-04-06

## 2022-04-18 ENCOUNTER — DOCUMENTATION (OUTPATIENT)
Dept: PHARMACY | Facility: TELEHEALTH | Age: 57
End: 2022-04-18

## 2022-04-21 ENCOUNTER — DOCUMENTATION (OUTPATIENT)
Dept: PHARMACY | Facility: TELEHEALTH | Age: 57
End: 2022-04-21

## 2022-04-22 ENCOUNTER — SPECIALTY PHARMACY (OUTPATIENT)
Dept: PHARMACY | Facility: TELEHEALTH | Age: 57
End: 2022-04-22

## 2022-06-08 ENCOUNTER — OFFICE VISIT (OUTPATIENT)
Dept: FAMILY MEDICINE CLINIC | Facility: CLINIC | Age: 57
End: 2022-06-08

## 2022-06-08 VITALS
HEART RATE: 62 BPM | WEIGHT: 187 LBS | BODY MASS INDEX: 26.77 KG/M2 | HEIGHT: 70 IN | SYSTOLIC BLOOD PRESSURE: 122 MMHG | DIASTOLIC BLOOD PRESSURE: 64 MMHG | RESPIRATION RATE: 16 BRPM | OXYGEN SATURATION: 98 % | TEMPERATURE: 97 F

## 2022-06-08 DIAGNOSIS — R05.9 COUGH: ICD-10-CM

## 2022-06-08 DIAGNOSIS — E55.9 VITAMIN D DEFICIENCY: ICD-10-CM

## 2022-06-08 DIAGNOSIS — E89.2 STATUS POST PARATHYROIDECTOMY: ICD-10-CM

## 2022-06-08 DIAGNOSIS — R79.89 LFT ELEVATION: ICD-10-CM

## 2022-06-08 DIAGNOSIS — I10 ESSENTIAL HYPERTENSION: Primary | ICD-10-CM

## 2022-06-08 DIAGNOSIS — E78.2 HYPERLIPIDEMIA, MIXED: ICD-10-CM

## 2022-06-08 PROCEDURE — 71046 X-RAY EXAM CHEST 2 VIEWS: CPT | Performed by: PHYSICIAN ASSISTANT

## 2022-06-08 PROCEDURE — 99214 OFFICE O/P EST MOD 30 MIN: CPT | Performed by: PHYSICIAN ASSISTANT

## 2022-06-08 RX ORDER — PANTOPRAZOLE SODIUM 40 MG/1
40 TABLET, DELAYED RELEASE ORAL DAILY
COMMUNITY
Start: 2022-04-14 | End: 2022-06-08 | Stop reason: SDUPTHER

## 2022-06-08 RX ORDER — PANTOPRAZOLE SODIUM 40 MG/1
40 TABLET, DELAYED RELEASE ORAL 2 TIMES DAILY
Qty: 180 TABLET | Refills: 3 | Status: SHIPPED | OUTPATIENT
Start: 2022-06-08 | End: 2023-01-17 | Stop reason: SDUPTHER

## 2022-06-08 NOTE — PROGRESS NOTES
"Virginia Shepherd is a 56 y.o. female.     History of Present Illness    Since the last visit, she has overall felt well.  She has Primary Hypertension and well controlled on current medication, GERD controlled on PPI Rx, Hyperlipidemia and working on this with diet and exercise and Vitamin D deficiency and will update labs for continued management.  she has been compliant with current medications have reviewed them.  The patient denies medication side effects.  Will refill medications. /64 (BP Location: Left arm, Patient Position: Sitting, Cuff Size: Adult)   Pulse 62   Temp 97 °F (36.1 °C) (Skin)   Resp 16   Ht 177.8 cm (70\")   Wt 84.8 kg (187 lb)   SpO2 98%   BMI 26.83 kg/m²   Dry cough in PM over a year;    Results for orders placed or performed in visit on 12/08/21   Comprehensive metabolic panel    Specimen: Blood   Result Value Ref Range    Glucose 81 65 - 99 mg/dL    BUN 13 6 - 24 mg/dL    Creatinine 0.78 0.57 - 1.00 mg/dL    eGFR Non African Am 86 >59 mL/min/1.73    eGFR African Am 99 >59 mL/min/1.73    BUN/Creatinine Ratio 17 9 - 23    Sodium 139 134 - 144 mmol/L    Potassium 4.4 3.5 - 5.2 mmol/L    Chloride 100 96 - 106 mmol/L    Total CO2 25 20 - 29 mmol/L    Calcium 9.7 8.7 - 10.2 mg/dL    Total Protein 7.0 6.0 - 8.5 g/dL    Albumin 4.6 3.8 - 4.9 g/dL    Globulin 2.4 1.5 - 4.5 g/dL    A/G Ratio 1.9 1.2 - 2.2    Total Bilirubin 0.7 0.0 - 1.2 mg/dL    Alkaline Phosphatase 77 44 - 121 IU/L    AST (SGOT) 39 0 - 40 IU/L    ALT (SGPT) 45 (H) 0 - 32 IU/L   Lipid panel    Specimen: Blood   Result Value Ref Range    Total Cholesterol 183 100 - 199 mg/dL    Triglycerides 155 (H) 0 - 149 mg/dL    HDL Cholesterol 50 >39 mg/dL    VLDL Cholesterol Juan 27 5 - 40 mg/dL    LDL Chol Calc (NIH) 106 (H) 0 - 99 mg/dL   TSH    Specimen: Blood   Result Value Ref Range    TSH 2.070 0.450 - 4.500 uIU/mL   Hemoglobin A1c    Specimen: Blood   Result Value Ref Range    Hemoglobin A1C 5.4 4.8 - 5.6 %   T4, " Free    Specimen: Blood   Result Value Ref Range    Free T4 1.01 0.82 - 1.77 ng/dL   T3, Free    Specimen: Blood   Result Value Ref Range    T3, Free 3.3 2.0 - 4.4 pg/mL   Vitamin D 25 Hydroxy    Specimen: Blood   Result Value Ref Range    25 Hydroxy, Vitamin D 29.4 (L) 30.0 - 100.0 ng/mL   Vitamin B12    Specimen: Blood   Result Value Ref Range    Vitamin B-12 462 232 - 1,245 pg/mL   Folate    Specimen: Blood   Result Value Ref Range    Folate 11.7 >3.0 ng/mL   Calcium, Ionized    Specimen: Blood   Result Value Ref Range    Ionized Calcium 5.4 4.5 - 5.6 mg/dL   PTH, Intact    Specimen: Blood   Result Value Ref Range    PTH, Intact 53 15 - 65 pg/mL   CBC and Differential    Specimen: Blood   Result Value Ref Range    WBC 7.0 3.4 - 10.8 x10E3/uL    RBC 4.78 3.77 - 5.28 x10E6/uL    Hemoglobin 15.2 11.1 - 15.9 g/dL    Hematocrit 42.5 34.0 - 46.6 %    MCV 89 79 - 97 fL    MCH 31.8 26.6 - 33.0 pg    MCHC 35.8 (H) 31.5 - 35.7 g/dL    RDW 12.4 11.7 - 15.4 %    Platelets 220 150 - 450 x10E3/uL    Neutrophil Rel % 62 Not Estab. %    Lymphocyte Rel % 28 Not Estab. %    Monocyte Rel % 7 Not Estab. %    Eosinophil Rel % 2 Not Estab. %    Basophil Rel % 1 Not Estab. %    Neutrophils Absolute 4.3 1.4 - 7.0 x10E3/uL    Lymphocytes Absolute 2.0 0.7 - 3.1 x10E3/uL    Monocytes Absolute 0.5 0.1 - 0.9 x10E3/uL    Eosinophils Absolute 0.2 0.0 - 0.4 x10E3/uL    Basophils Absolute 0.1 0.0 - 0.2 x10E3/uL    Immature Granulocyte Rel % 0 Not Estab. %    Immature Grans Absolute 0.0 0.0 - 0.1 x10E3/uL   DR Foreman aware of LFTs and she is off Humira right now in the dermatologist did not advise to see liver specialist yet-- has seen Dr Buckner----Psoriasis;  Dr Leal---saw in past  Also reviewed labs from 2016 Dr. Camejo.  This was a follow-up visit from parathyroidectomy x1 and note thyroid nodule stable to follow-up in 6 months  She has also been on Fosamax in the past for osteopenia treatment  Only took few mos    GYN does mammo  Just  moved    The following portions of the patient's history were reviewed and updated as appropriate: allergies, current medications, past family history, past medical history, past social history, past surgical history and problem list.    Review of Systems   Constitutional: Negative for activity change, appetite change and unexpected weight change.   HENT: Negative for nosebleeds and trouble swallowing.    Eyes: Negative for pain and visual disturbance.   Respiratory: Positive for cough. Negative for chest tightness, shortness of breath and wheezing.    Cardiovascular: Negative for chest pain and palpitations.   Gastrointestinal: Negative for abdominal pain and blood in stool.   Endocrine: Negative.    Genitourinary: Negative for difficulty urinating and hematuria.   Musculoskeletal: Negative for joint swelling.   Skin: Negative for color change and rash.   Allergic/Immunologic: Negative.    Neurological: Negative for syncope and speech difficulty.   Hematological: Negative for adenopathy.   Psychiatric/Behavioral: Negative for agitation and confusion.   All other systems reviewed and are negative.      Objective   Physical Exam  Vitals and nursing note reviewed.   Constitutional:       General: She is not in acute distress.     Appearance: She is well-developed. She is not ill-appearing or toxic-appearing.   HENT:      Head: Normocephalic.      Right Ear: External ear normal.      Left Ear: External ear normal.      Nose: Nose normal. No congestion.      Mouth/Throat:      Pharynx: Oropharynx is clear. No posterior oropharyngeal erythema.   Eyes:      General: No scleral icterus.     Conjunctiva/sclera: Conjunctivae normal.      Pupils: Pupils are equal, round, and reactive to light.   Neck:      Thyroid: No thyromegaly.      Vascular: No carotid bruit.   Cardiovascular:      Rate and Rhythm: Normal rate and regular rhythm.      Heart sounds: Normal heart sounds. No murmur heard.  Pulmonary:      Effort: Pulmonary  effort is normal. No respiratory distress.      Breath sounds: Normal breath sounds. No rales.   Musculoskeletal:         General: No deformity. Normal range of motion.      Cervical back: Normal range of motion and neck supple.      Right lower leg: No edema.      Left lower leg: No edema.   Skin:     General: Skin is warm and dry.      Findings: No rash.   Neurological:      General: No focal deficit present.      Mental Status: She is alert and oriented to person, place, and time. Mental status is at baseline.   Psychiatric:         Mood and Affect: Mood normal.         Behavior: Behavior normal.         Thought Content: Thought content normal.         Judgment: Judgment normal.         Assessment & Plan   Diagnoses and all orders for this visit:    1. Essential hypertension (Primary)  -     Cancel: Comprehensive metabolic panel  -     Cancel: Lipid panel  -     Cancel: CBC and Differential  -     Cancel: TSH  -     Cancel: Hemoglobin A1c  -     Cancel: T3, Free  -     Cancel: T4, Free  -     Cancel: Vitamin B12  -     Cancel: Folate  -     Cancel: Vitamin D 25 Hydroxy  -     Comprehensive metabolic panel  -     Lipid panel  -     CBC and Differential  -     TSH  -     T3, Free  -     T4, Free  -     Vitamin D 25 Hydroxy  -     Vitamin B12  -     Folate  -     Magnesium  -     Hemoglobin A1c    2. Vitamin D deficiency  -     Cancel: Comprehensive metabolic panel  -     Cancel: Lipid panel  -     Cancel: CBC and Differential  -     Cancel: TSH  -     Cancel: Hemoglobin A1c  -     Cancel: T3, Free  -     Cancel: T4, Free  -     Cancel: Vitamin B12  -     Cancel: Folate  -     Cancel: Vitamin D 25 Hydroxy  -     Comprehensive metabolic panel  -     Lipid panel  -     CBC and Differential  -     TSH  -     T3, Free  -     T4, Free  -     Vitamin D 25 Hydroxy  -     Vitamin B12  -     Folate  -     Magnesium  -     Hemoglobin A1c    3. Status post parathyroidectomy (HCC)  -     Cancel: Comprehensive metabolic panel  -      Cancel: Lipid panel  -     Cancel: CBC and Differential  -     Cancel: TSH  -     Cancel: Hemoglobin A1c  -     Cancel: T3, Free  -     Cancel: T4, Free  -     Cancel: Vitamin B12  -     Cancel: Folate  -     Cancel: Vitamin D 25 Hydroxy  -     Comprehensive metabolic panel  -     Lipid panel  -     CBC and Differential  -     TSH  -     T3, Free  -     T4, Free  -     Vitamin D 25 Hydroxy  -     Vitamin B12  -     Folate  -     Magnesium  -     Hemoglobin A1c    4. LFT elevation  -     Cancel: Comprehensive metabolic panel  -     Cancel: Lipid panel  -     Cancel: CBC and Differential  -     Cancel: TSH  -     Cancel: Hemoglobin A1c  -     Cancel: T3, Free  -     Cancel: T4, Free  -     Cancel: Vitamin B12  -     Cancel: Folate  -     Cancel: Vitamin D 25 Hydroxy  -     Comprehensive metabolic panel  -     Lipid panel  -     CBC and Differential  -     TSH  -     T3, Free  -     T4, Free  -     Vitamin D 25 Hydroxy  -     Vitamin B12  -     Folate  -     Magnesium  -     Hemoglobin A1c    5. Hyperlipidemia, mixed  -     Cancel: Comprehensive metabolic panel  -     Cancel: Lipid panel  -     Cancel: CBC and Differential  -     Cancel: TSH  -     Cancel: Hemoglobin A1c  -     Cancel: T3, Free  -     Cancel: T4, Free  -     Cancel: Vitamin B12  -     Cancel: Folate  -     Cancel: Vitamin D 25 Hydroxy  -     Comprehensive metabolic panel  -     Lipid panel  -     CBC and Differential  -     TSH  -     T3, Free  -     T4, Free  -     Vitamin D 25 Hydroxy  -     Vitamin B12  -     Folate  -     Magnesium  -     Hemoglobin A1c        Call Dr Camejo---see if due for thyroid US follow up---prior nodule  CXR---d/t nighttime cough--see ENT; concern about GERD  Will Rx PPI BID and see if stops cough  GYN does mammo  Sees dermatology Dr. Foreman for psoriasis  Consider CT chest if cough continues with increasing PPI to twice daily and confirm negative chest x-ray  Plan, Adriana Shepherd, was seen today.  she was seen for  HTN and continue medication, GERD and will continue on PPI medication, Hyperlipidemia and will work on this with diet and exercise and Vitamin D deficiency and will update labs .

## 2022-12-07 RX ORDER — LOSARTAN POTASSIUM 25 MG/1
TABLET ORAL
Qty: 45 TABLET | Refills: 0 | Status: SHIPPED | OUTPATIENT
Start: 2022-12-07 | End: 2023-01-17 | Stop reason: SDUPTHER

## 2022-12-16 LAB
25(OH)D3+25(OH)D2 SERPL-MCNC: 42.4 NG/ML (ref 30–100)
ALBUMIN SERPL-MCNC: 4.6 G/DL (ref 3.5–5.2)
ALBUMIN/GLOB SERPL: 2 G/DL
ALP SERPL-CCNC: 70 U/L (ref 39–117)
ALT SERPL-CCNC: 28 U/L (ref 1–33)
AST SERPL-CCNC: 24 U/L (ref 1–32)
BASOPHILS # BLD AUTO: 0.09 10*3/MM3 (ref 0–0.2)
BASOPHILS NFR BLD AUTO: 1.4 % (ref 0–1.5)
BILIRUB SERPL-MCNC: 0.8 MG/DL (ref 0–1.2)
BUN SERPL-MCNC: 11 MG/DL (ref 6–20)
BUN/CREAT SERPL: 13.9 (ref 7–25)
CALCIUM SERPL-MCNC: 9.9 MG/DL (ref 8.6–10.5)
CHLORIDE SERPL-SCNC: 103 MMOL/L (ref 98–107)
CHOLEST SERPL-MCNC: 190 MG/DL (ref 0–200)
CO2 SERPL-SCNC: 28 MMOL/L (ref 22–29)
CREAT SERPL-MCNC: 0.79 MG/DL (ref 0.57–1)
EGFRCR SERPLBLD CKD-EPI 2021: 87.9 ML/MIN/1.73
EOSINOPHIL # BLD AUTO: 0.17 10*3/MM3 (ref 0–0.4)
EOSINOPHIL NFR BLD AUTO: 2.6 % (ref 0.3–6.2)
ERYTHROCYTE [DISTWIDTH] IN BLOOD BY AUTOMATED COUNT: 13.2 % (ref 12.3–15.4)
FOLATE SERPL-MCNC: 10 NG/ML (ref 4.78–24.2)
GLOBULIN SER CALC-MCNC: 2.3 GM/DL
GLUCOSE SERPL-MCNC: 85 MG/DL (ref 65–99)
HBA1C MFR BLD: 5.4 % (ref 4.8–5.6)
HCT VFR BLD AUTO: 44.9 % (ref 34–46.6)
HDLC SERPL-MCNC: 46 MG/DL (ref 40–60)
HGB BLD-MCNC: 15.4 G/DL (ref 12–15.9)
IMM GRANULOCYTES # BLD AUTO: 0.02 10*3/MM3 (ref 0–0.05)
IMM GRANULOCYTES NFR BLD AUTO: 0.3 % (ref 0–0.5)
LDLC SERPL CALC-MCNC: 113 MG/DL (ref 0–100)
LYMPHOCYTES # BLD AUTO: 2.04 10*3/MM3 (ref 0.7–3.1)
LYMPHOCYTES NFR BLD AUTO: 30.7 % (ref 19.6–45.3)
MAGNESIUM SERPL-MCNC: 2.2 MG/DL (ref 1.6–2.6)
MCH RBC QN AUTO: 31.3 PG (ref 26.6–33)
MCHC RBC AUTO-ENTMCNC: 34.3 G/DL (ref 31.5–35.7)
MCV RBC AUTO: 91.3 FL (ref 79–97)
MONOCYTES # BLD AUTO: 0.47 10*3/MM3 (ref 0.1–0.9)
MONOCYTES NFR BLD AUTO: 7.1 % (ref 5–12)
NEUTROPHILS # BLD AUTO: 3.85 10*3/MM3 (ref 1.7–7)
NEUTROPHILS NFR BLD AUTO: 57.9 % (ref 42.7–76)
NRBC BLD AUTO-RTO: 0.2 /100 WBC (ref 0–0.2)
PLATELET # BLD AUTO: 212 10*3/MM3 (ref 140–450)
POTASSIUM SERPL-SCNC: 4.2 MMOL/L (ref 3.5–5.2)
PROT SERPL-MCNC: 6.9 G/DL (ref 6–8.5)
RBC # BLD AUTO: 4.92 10*6/MM3 (ref 3.77–5.28)
SODIUM SERPL-SCNC: 139 MMOL/L (ref 136–145)
T3FREE SERPL-MCNC: 2.8 PG/ML (ref 2–4.4)
T4 FREE SERPL-MCNC: 1.03 NG/DL (ref 0.93–1.7)
TRIGL SERPL-MCNC: 174 MG/DL (ref 0–150)
TSH SERPL DL<=0.005 MIU/L-ACNC: 2.9 UIU/ML (ref 0.27–4.2)
VIT B12 SERPL-MCNC: 415 PG/ML (ref 211–946)
VLDLC SERPL CALC-MCNC: 31 MG/DL (ref 5–40)
WBC # BLD AUTO: 6.64 10*3/MM3 (ref 3.4–10.8)

## 2022-12-20 ENCOUNTER — APPOINTMENT (OUTPATIENT)
Dept: WOMENS IMAGING | Facility: HOSPITAL | Age: 57
End: 2022-12-20
Payer: COMMERCIAL

## 2022-12-20 PROCEDURE — 77063 BREAST TOMOSYNTHESIS BI: CPT | Performed by: RADIOLOGY

## 2022-12-20 PROCEDURE — 77067 SCR MAMMO BI INCL CAD: CPT | Performed by: RADIOLOGY

## 2023-01-17 ENCOUNTER — OFFICE VISIT (OUTPATIENT)
Dept: FAMILY MEDICINE CLINIC | Facility: CLINIC | Age: 58
End: 2023-01-17
Payer: COMMERCIAL

## 2023-01-17 VITALS
WEIGHT: 191 LBS | HEIGHT: 70 IN | RESPIRATION RATE: 16 BRPM | TEMPERATURE: 97.5 F | OXYGEN SATURATION: 98 % | SYSTOLIC BLOOD PRESSURE: 110 MMHG | HEART RATE: 77 BPM | BODY MASS INDEX: 27.35 KG/M2 | DIASTOLIC BLOOD PRESSURE: 60 MMHG

## 2023-01-17 DIAGNOSIS — I10 ESSENTIAL HYPERTENSION: Primary | ICD-10-CM

## 2023-01-17 DIAGNOSIS — E55.9 VITAMIN D DEFICIENCY: ICD-10-CM

## 2023-01-17 DIAGNOSIS — L40.9 PSORIASIS: ICD-10-CM

## 2023-01-17 DIAGNOSIS — E78.2 HYPERLIPIDEMIA, MIXED: ICD-10-CM

## 2023-01-17 PROCEDURE — 99214 OFFICE O/P EST MOD 30 MIN: CPT | Performed by: PHYSICIAN ASSISTANT

## 2023-01-17 RX ORDER — PANTOPRAZOLE SODIUM 40 MG/1
40 TABLET, DELAYED RELEASE ORAL 2 TIMES DAILY
Qty: 180 TABLET | Refills: 3 | Status: SHIPPED | OUTPATIENT
Start: 2023-01-17

## 2023-01-17 RX ORDER — LOSARTAN POTASSIUM 25 MG/1
37.5 TABLET ORAL DAILY
Qty: 45 TABLET | Refills: 1 | Status: SHIPPED | OUTPATIENT
Start: 2023-01-17 | End: 2023-04-02

## 2023-01-17 NOTE — PROGRESS NOTES
"Virginia Pérez is a 57 y.o. female.     History of Present Illness    Since the last visit, she has overall felt well.  She has Primary Hypertension and well controlled on current medication, GERD controlled on PPI Rx, Hyperlipidemia and working on this with diet and exercise and Vitamin D deficiency and labs are at goal >30 ng/mL.  she has been compliant with current medications have reviewed them.  The patient denies medication side effects.  Will refill medications. /60 (BP Location: Left arm, Patient Position: Sitting, Cuff Size: Adult)   Pulse 77   Temp 97.5 °F (36.4 °C) (Oral)   Resp 16   Ht 177.8 cm (70\")   Wt 86.6 kg (191 lb)   SpO2 98%   BMI 27.41 kg/m²     Results for orders placed or performed in visit on 06/08/22   Comprehensive metabolic panel    Specimen: Blood   Result Value Ref Range    Glucose 85 65 - 99 mg/dL    BUN 11 6 - 20 mg/dL    Creatinine 0.79 0.57 - 1.00 mg/dL    EGFR Result 87.9 >60.0 mL/min/1.73    BUN/Creatinine Ratio 13.9 7.0 - 25.0    Sodium 139 136 - 145 mmol/L    Potassium 4.2 3.5 - 5.2 mmol/L    Chloride 103 98 - 107 mmol/L    Total CO2 28.0 22.0 - 29.0 mmol/L    Calcium 9.9 8.6 - 10.5 mg/dL    Total Protein 6.9 6.0 - 8.5 g/dL    Albumin 4.60 3.50 - 5.20 g/dL    Globulin 2.3 gm/dL    A/G Ratio 2.0 g/dL    Total Bilirubin 0.8 0.0 - 1.2 mg/dL    Alkaline Phosphatase 70 39 - 117 U/L    AST (SGOT) 24 1 - 32 U/L    ALT (SGPT) 28 1 - 33 U/L   Lipid panel    Specimen: Blood   Result Value Ref Range    Total Cholesterol 190 0 - 200 mg/dL    Triglycerides 174 (H) 0 - 150 mg/dL    HDL Cholesterol 46 40 - 60 mg/dL    VLDL Cholesterol Juan 31 5 - 40 mg/dL    LDL Chol Calc (NIH) 113 (H) 0 - 100 mg/dL   TSH    Specimen: Blood   Result Value Ref Range    TSH 2.900 0.270 - 4.200 uIU/mL   T3, Free    Specimen: Blood   Result Value Ref Range    T3, Free 2.8 2.0 - 4.4 pg/mL   T4, Free    Specimen: Blood   Result Value Ref Range    Free T4 1.03 0.93 - 1.70 ng/dL   Vitamin D " 25 Hydroxy    Specimen: Blood   Result Value Ref Range    25 Hydroxy, Vitamin D 42.4 30.0 - 100.0 ng/ml   Vitamin B12    Specimen: Blood   Result Value Ref Range    Vitamin B-12 415 211 - 946 pg/mL   Folate    Specimen: Blood   Result Value Ref Range    Folate 10.00 4.78 - 24.20 ng/mL   Magnesium    Specimen: Blood   Result Value Ref Range    Magnesium 2.2 1.6 - 2.6 mg/dL   Hemoglobin A1c    Specimen: Blood   Result Value Ref Range    Hemoglobin A1C 5.40 4.80 - 5.60 %   CBC and Differential    Specimen: Blood   Result Value Ref Range    WBC 6.64 3.40 - 10.80 10*3/mm3    RBC 4.92 3.77 - 5.28 10*6/mm3    Hemoglobin 15.4 12.0 - 15.9 g/dL    Hematocrit 44.9 34.0 - 46.6 %    MCV 91.3 79.0 - 97.0 fL    MCH 31.3 26.6 - 33.0 pg    MCHC 34.3 31.5 - 35.7 g/dL    RDW 13.2 12.3 - 15.4 %    Platelets 212 140 - 450 10*3/mm3    Neutrophil Rel % 57.9 42.7 - 76.0 %    Lymphocyte Rel % 30.7 19.6 - 45.3 %    Monocyte Rel % 7.1 5.0 - 12.0 %    Eosinophil Rel % 2.6 0.3 - 6.2 %    Basophil Rel % 1.4 0.0 - 1.5 %    Neutrophils Absolute 3.85 1.70 - 7.00 10*3/mm3    Lymphocytes Absolute 2.04 0.70 - 3.10 10*3/mm3    Monocytes Absolute 0.47 0.10 - 0.90 10*3/mm3    Eosinophils Absolute 0.17 0.00 - 0.40 10*3/mm3    Basophils Absolute 0.09 0.00 - 0.20 10*3/mm3    Immature Granulocyte Rel % 0.3 0.0 - 0.5 %    Immature Grans Absolute 0.02 0.00 - 0.05 10*3/mm3    nRBC 0.2 0.0 - 0.2 /100 WBC   chol score 3%  DR Foreman aware of LFTs and she is back on Humira right now in the dermatologist did not advise to see liver specialist yet-- has seen Dr Buckner----Psoriasis;  Dr Leal---saw in past    Did CXR last visit for cough---on PPI---Cough resolved---GERD is controlled on PPI Rx.  Also reviewed labs from 2016 Dr. Camejo.  This was a follow-up visit from parathyroidectomy x1 and note thyroid nodule stable ---called office and note f/u PRN  Sees GYN  GYN orders DEXA and mammo  Only took few mos  Exercises most days--elipictal   Mom--breast cancer    The  following portions of the patient's history were reviewed and updated as appropriate: allergies, current medications, past family history, past medical history, past social history, past surgical history and problem list.    Review of Systems   Constitutional: Negative for activity change, appetite change and unexpected weight change.   HENT: Negative for nosebleeds and trouble swallowing.    Eyes: Negative for pain and visual disturbance.   Respiratory: Negative for chest tightness, shortness of breath and wheezing.    Cardiovascular: Negative for chest pain and palpitations.   Gastrointestinal: Negative for abdominal pain and blood in stool.   Endocrine: Negative.    Genitourinary: Negative for difficulty urinating and hematuria.   Musculoskeletal: Negative for joint swelling.   Skin: Negative for color change and rash.   Allergic/Immunologic: Negative.    Neurological: Negative for syncope and speech difficulty.   Hematological: Negative for adenopathy.   Psychiatric/Behavioral: Negative for agitation and confusion.   All other systems reviewed and are negative.      Objective   Physical Exam  Vitals and nursing note reviewed.   Constitutional:       General: She is not in acute distress.     Appearance: She is well-developed. She is not ill-appearing or toxic-appearing.   HENT:      Head: Normocephalic.      Right Ear: External ear normal.      Left Ear: External ear normal.      Nose: Nose normal.      Mouth/Throat:      Pharynx: Oropharynx is clear.   Eyes:      General: No scleral icterus.     Conjunctiva/sclera: Conjunctivae normal.      Pupils: Pupils are equal, round, and reactive to light.   Neck:      Thyroid: No thyromegaly.      Vascular: No carotid bruit.   Cardiovascular:      Rate and Rhythm: Normal rate and regular rhythm.      Heart sounds: Normal heart sounds. No murmur heard.  Pulmonary:      Effort: Pulmonary effort is normal. No respiratory distress.      Breath sounds: Normal breath sounds.  No rales.   Musculoskeletal:         General: No deformity. Normal range of motion.      Cervical back: Normal range of motion and neck supple.   Skin:     General: Skin is warm and dry.      Findings: No rash.   Neurological:      General: No focal deficit present.      Mental Status: She is alert and oriented to person, place, and time. Mental status is at baseline.   Psychiatric:         Mood and Affect: Mood normal.         Behavior: Behavior normal.         Thought Content: Thought content normal.         Judgment: Judgment normal.         Assessment & Plan   Diagnoses and all orders for this visit:    1. Essential hypertension (Primary)    2. Hyperlipidemia, mixed    3. Vitamin D deficiency    4. Psoriasis    Other orders  -     pantoprazole (PROTONIX) 40 MG EC tablet; Take 1 tablet by mouth 2 (Two) Times a Day. For GERD  Dispense: 180 tablet; Refill: 3  -     losartan (COZAAR) 25 MG tablet; Take 1.5 tablets by mouth Daily. For BP  Dispense: 45 tablet; Refill: 1        GERD is controlled on PPI Rx.  Sees GYN--does mammo and DEXA  [facility] and [facility]   Plan, Adriana Pérez, was seen today.  she was seen for HTN and continue medication, GERD and will continue on PPI medication, Hyperlipidemia and will work on this with diet and exercise and Vitamin D deficiency and supplemented.         Answers for HPI/ROS submitted by the patient on 1/16/2023  What is the primary reason for your visit?: High Blood Pressure

## 2023-02-07 ENCOUNTER — APPOINTMENT (OUTPATIENT)
Dept: WOMENS IMAGING | Facility: HOSPITAL | Age: 58
End: 2023-02-07
Payer: COMMERCIAL

## 2023-02-07 PROCEDURE — 77065 DX MAMMO INCL CAD UNI: CPT | Performed by: RADIOLOGY

## 2023-02-07 PROCEDURE — G0279 TOMOSYNTHESIS, MAMMO: HCPCS | Performed by: RADIOLOGY

## 2023-02-07 PROCEDURE — 76642 ULTRASOUND BREAST LIMITED: CPT | Performed by: RADIOLOGY

## 2023-02-07 PROCEDURE — 77061 BREAST TOMOSYNTHESIS UNI: CPT | Performed by: RADIOLOGY

## 2023-04-02 RX ORDER — LOSARTAN POTASSIUM 25 MG/1
TABLET ORAL
Qty: 45 TABLET | Refills: 0 | Status: SHIPPED | OUTPATIENT
Start: 2023-04-02

## 2023-05-12 RX ORDER — LOSARTAN POTASSIUM 25 MG/1
TABLET ORAL
Qty: 45 TABLET | Refills: 0 | Status: SHIPPED | OUTPATIENT
Start: 2023-05-12

## 2023-06-03 RX ORDER — LOSARTAN POTASSIUM 25 MG/1
TABLET ORAL
Qty: 45 TABLET | Refills: 0 | Status: SHIPPED | OUTPATIENT
Start: 2023-06-03

## 2023-08-08 ENCOUNTER — APPOINTMENT (OUTPATIENT)
Dept: WOMENS IMAGING | Facility: HOSPITAL | Age: 58
End: 2023-08-08
Payer: COMMERCIAL

## 2023-08-08 PROCEDURE — 76642 ULTRASOUND BREAST LIMITED: CPT | Performed by: RADIOLOGY

## 2023-08-08 PROCEDURE — 77065 DX MAMMO INCL CAD UNI: CPT | Performed by: RADIOLOGY

## 2023-08-08 PROCEDURE — G0279 TOMOSYNTHESIS, MAMMO: HCPCS | Performed by: RADIOLOGY

## 2023-08-08 PROCEDURE — 77061 BREAST TOMOSYNTHESIS UNI: CPT | Performed by: RADIOLOGY

## 2023-10-03 RX ORDER — LOSARTAN POTASSIUM 25 MG/1
TABLET ORAL
Qty: 135 TABLET | Refills: 0 | Status: SHIPPED | OUTPATIENT
Start: 2023-10-03

## 2023-11-08 ENCOUNTER — OFFICE VISIT (OUTPATIENT)
Dept: FAMILY MEDICINE CLINIC | Facility: CLINIC | Age: 58
End: 2023-11-08
Payer: COMMERCIAL

## 2023-11-08 VITALS
BODY MASS INDEX: 28.06 KG/M2 | WEIGHT: 196 LBS | HEIGHT: 70 IN | SYSTOLIC BLOOD PRESSURE: 130 MMHG | OXYGEN SATURATION: 97 % | TEMPERATURE: 97.5 F | HEART RATE: 69 BPM | DIASTOLIC BLOOD PRESSURE: 92 MMHG | RESPIRATION RATE: 16 BRPM

## 2023-11-08 DIAGNOSIS — E04.1 THYROID NODULE: ICD-10-CM

## 2023-11-08 DIAGNOSIS — E78.2 HYPERLIPIDEMIA, MIXED: ICD-10-CM

## 2023-11-08 DIAGNOSIS — I10 ESSENTIAL HYPERTENSION: Primary | ICD-10-CM

## 2023-11-08 DIAGNOSIS — E89.2 STATUS POST PARATHYROIDECTOMY: ICD-10-CM

## 2023-11-08 DIAGNOSIS — L40.9 PSORIASIS: ICD-10-CM

## 2023-11-08 DIAGNOSIS — E55.9 VITAMIN D DEFICIENCY: ICD-10-CM

## 2023-11-08 RX ORDER — FAMOTIDINE 20 MG/1
20 TABLET, FILM COATED ORAL 2 TIMES DAILY
Qty: 180 TABLET | Refills: 3 | Status: SHIPPED | OUTPATIENT
Start: 2023-11-08

## 2023-11-08 RX ORDER — LOSARTAN POTASSIUM 50 MG/1
50 TABLET ORAL DAILY
Qty: 90 TABLET | Refills: 1 | Status: SHIPPED | OUTPATIENT
Start: 2023-11-08

## 2023-11-08 NOTE — PROGRESS NOTES
Immunization  Immunization performed in Left deltoid by Paulina Mata. Patient tolerated the procedure well without complications.  11/08/23   Paulina Mata

## 2023-11-08 NOTE — PROGRESS NOTES
Subjective   Adriana Pérez is a 58 y.o. female.     History of Present Illness    Since the last visit, she has overall felt well.  She has Primary Hypertension not at goal, plan to add/adjust medication, GERD well controlled on PPI and plan trial of step down therapy with H2 blocker, Hyperlipidemia and working on this with diet and exercise, and Vitamin D deficiency and will update labs for continued management.  she has been compliant with current medications have reviewed them.  The patient denies medication side effects.  Will refill medications. There were no vitals taken for this visit.  Weight went up.  Results for orders placed or performed in visit on 06/08/22   Comprehensive metabolic panel    Specimen: Blood   Result Value Ref Range    Glucose 85 65 - 99 mg/dL    BUN 11 6 - 20 mg/dL    Creatinine 0.79 0.57 - 1.00 mg/dL    EGFR Result 87.9 >60.0 mL/min/1.73    BUN/Creatinine Ratio 13.9 7.0 - 25.0    Sodium 139 136 - 145 mmol/L    Potassium 4.2 3.5 - 5.2 mmol/L    Chloride 103 98 - 107 mmol/L    Total CO2 28.0 22.0 - 29.0 mmol/L    Calcium 9.9 8.6 - 10.5 mg/dL    Total Protein 6.9 6.0 - 8.5 g/dL    Albumin 4.60 3.50 - 5.20 g/dL    Globulin 2.3 gm/dL    A/G Ratio 2.0 g/dL    Total Bilirubin 0.8 0.0 - 1.2 mg/dL    Alkaline Phosphatase 70 39 - 117 U/L    AST (SGOT) 24 1 - 32 U/L    ALT (SGPT) 28 1 - 33 U/L   Lipid panel    Specimen: Blood   Result Value Ref Range    Total Cholesterol 190 0 - 200 mg/dL    Triglycerides 174 (H) 0 - 150 mg/dL    HDL Cholesterol 46 40 - 60 mg/dL    VLDL Cholesterol Juan 31 5 - 40 mg/dL    LDL Chol Calc (NIH) 113 (H) 0 - 100 mg/dL   TSH    Specimen: Blood   Result Value Ref Range    TSH 2.900 0.270 - 4.200 uIU/mL   T3, Free    Specimen: Blood   Result Value Ref Range    T3, Free 2.8 2.0 - 4.4 pg/mL   T4, Free    Specimen: Blood   Result Value Ref Range    Free T4 1.03 0.93 - 1.70 ng/dL   Vitamin D 25 Hydroxy    Specimen: Blood   Result Value Ref Range    25 Hydroxy, Vitamin D  42.4 30.0 - 100.0 ng/ml   Vitamin B12    Specimen: Blood   Result Value Ref Range    Vitamin B-12 415 211 - 946 pg/mL   Folate    Specimen: Blood   Result Value Ref Range    Folate 10.00 4.78 - 24.20 ng/mL   Magnesium    Specimen: Blood   Result Value Ref Range    Magnesium 2.2 1.6 - 2.6 mg/dL   Hemoglobin A1c    Specimen: Blood   Result Value Ref Range    Hemoglobin A1C 5.40 4.80 - 5.60 %   CBC and Differential    Specimen: Blood   Result Value Ref Range    WBC 6.64 3.40 - 10.80 10*3/mm3    RBC 4.92 3.77 - 5.28 10*6/mm3    Hemoglobin 15.4 12.0 - 15.9 g/dL    Hematocrit 44.9 34.0 - 46.6 %    MCV 91.3 79.0 - 97.0 fL    MCH 31.3 26.6 - 33.0 pg    MCHC 34.3 31.5 - 35.7 g/dL    RDW 13.2 12.3 - 15.4 %    Platelets 212 140 - 450 10*3/mm3    Neutrophil Rel % 57.9 42.7 - 76.0 %    Lymphocyte Rel % 30.7 19.6 - 45.3 %    Monocyte Rel % 7.1 5.0 - 12.0 %    Eosinophil Rel % 2.6 0.3 - 6.2 %    Basophil Rel % 1.4 0.0 - 1.5 %    Neutrophils Absolute 3.85 1.70 - 7.00 10*3/mm3    Lymphocytes Absolute 2.04 0.70 - 3.10 10*3/mm3    Monocytes Absolute 0.47 0.10 - 0.90 10*3/mm3    Eosinophils Absolute 0.17 0.00 - 0.40 10*3/mm3    Basophils Absolute 0.09 0.00 - 0.20 10*3/mm3    Immature Granulocyte Rel % 0.3 0.0 - 0.5 %    Immature Grans Absolute 0.02 0.00 - 0.05 10*3/mm3    nRBC 0.2 0.0 - 0.2 /100 WBC     Last visit with me was 1/17/2023 following up on primary hypertension, GERD, diet-controlled mixed hyperlipidemia, vitamin D deficiency.  Noted last cholesterol rescore was 3%.  Patient under care of dermatologist Dr. Foreman and remains on Humira for treatment of psoriasis.  Has also seen rheumatologist Dr. Dudley  History of parathyroidectomy x1 and thyroid nodules under care of Dr. Camejo ENT.  GYN orders mammo and DEXA.  Exercises most days on the elliptical  Note that her mom has breast cancer history  Continue to watch liver enzymes with liver ultrasound resulted 1/20/2022 negative.  Last liver enzymes were normal on 12/15/2022  labs.  The following portions of the patient's history were reviewed and updated as appropriate: allergies, current medications, past family history, past medical history, past social history, past surgical history, and problem list.    Review of Systems   Constitutional:  Negative for diaphoresis.   HENT:  Negative for nosebleeds and trouble swallowing.    Eyes:  Negative for blurred vision and visual disturbance.   Respiratory:  Negative for choking.    Gastrointestinal:  Negative for blood in stool.   Skin:  Positive for skin lesions.   Allergic/Immunologic: Negative for immunocompromised state.   Neurological:  Negative for facial asymmetry and speech difficulty.   Psychiatric/Behavioral:  Negative for self-injury and suicidal ideas.        Objective   Physical Exam  Vitals and nursing note reviewed.   Constitutional:       General: She is not in acute distress.     Appearance: She is well-developed. She is not ill-appearing or toxic-appearing.   HENT:      Head: Normocephalic.      Right Ear: External ear normal.      Left Ear: External ear normal.      Nose: Nose normal.      Mouth/Throat:      Pharynx: Oropharynx is clear.   Eyes:      General: No scleral icterus.     Conjunctiva/sclera: Conjunctivae normal.      Pupils: Pupils are equal, round, and reactive to light.   Neck:      Thyroid: Thyromegaly present.   Cardiovascular:      Rate and Rhythm: Normal rate and regular rhythm.      Heart sounds: Normal heart sounds. No murmur heard.  Pulmonary:      Effort: Pulmonary effort is normal. No respiratory distress.      Breath sounds: Normal breath sounds.   Musculoskeletal:         General: No deformity. Normal range of motion.      Cervical back: Normal range of motion and neck supple.   Skin:     General: Skin is warm and dry.      Findings: No rash.   Neurological:      General: No focal deficit present.      Mental Status: She is alert and oriented to person, place, and time. Mental status is at baseline.    Psychiatric:         Mood and Affect: Mood normal.         Behavior: Behavior normal.         Thought Content: Thought content normal.         Judgment: Judgment normal.           Assessment & Plan   Diagnoses and all orders for this visit:    1. Essential hypertension (Primary)  Comments:  raise dose Losartan    2. Hyperlipidemia, mixed    3. Status post parathyroidectomy    4. Vitamin D deficiency    5. Psoriasis    6. Thyroid nodule  -     US Thyroid    Other orders  -     famotidine (PEPCID) 20 MG tablet; Take 1 tablet by mouth 2 (Two) Times a Day. For GERD and see if this can replace Pantoprazole  Dispense: 180 tablet; Refill: 3  -     losartan (Cozaar) 50 MG tablet; Take 1 tablet by mouth Daily. New dose for HTN  Dispense: 90 tablet; Refill: 1        Under care of of Dr. Foreman dermatologist for management of psoriasis and is on immunosuppressive therapy with Humira  GYN orders mammo and DEXA  Plan, Adriana Pérez, was seen today.  she was seen for HTN and add/adjust medication, GERD and has been well controlled on PPI and will do trial of H2 blocker , Hyperlipidemia and will work on this with diet and exercise, and Vitamin D deficiency and will update labs .  Hx thyroid nodules----last US was 2016---DR Camejo----s/p parathyroidectomy  Patient is well controlled on PPI and will do trial of H2 blocker like Pepcid for step down therapy.  she knows to change back to PPI if develops GERD on H2 blocker.

## 2023-11-09 LAB
25(OH)D3+25(OH)D2 SERPL-MCNC: 51.4 NG/ML (ref 30–100)
ALBUMIN SERPL-MCNC: 4.7 G/DL (ref 3.5–5.2)
ALBUMIN/GLOB SERPL: 1.9 G/DL
ALP SERPL-CCNC: 72 U/L (ref 39–117)
ALT SERPL-CCNC: 62 U/L (ref 1–33)
AST SERPL-CCNC: 44 U/L (ref 1–32)
BASOPHILS # BLD AUTO: 0.09 10*3/MM3 (ref 0–0.2)
BASOPHILS NFR BLD AUTO: 1.2 % (ref 0–1.5)
BILIRUB SERPL-MCNC: 0.8 MG/DL (ref 0–1.2)
BUN SERPL-MCNC: 13 MG/DL (ref 6–20)
BUN/CREAT SERPL: 16.5 (ref 7–25)
CALCIUM SERPL-MCNC: 10.1 MG/DL (ref 8.6–10.5)
CHLORIDE SERPL-SCNC: 102 MMOL/L (ref 98–107)
CHOLEST SERPL-MCNC: 211 MG/DL (ref 0–200)
CO2 SERPL-SCNC: 30.4 MMOL/L (ref 22–29)
CREAT SERPL-MCNC: 0.79 MG/DL (ref 0.57–1)
EGFRCR SERPLBLD CKD-EPI 2021: 87.4 ML/MIN/1.73
EOSINOPHIL # BLD AUTO: 0.19 10*3/MM3 (ref 0–0.4)
EOSINOPHIL NFR BLD AUTO: 2.5 % (ref 0.3–6.2)
ERYTHROCYTE [DISTWIDTH] IN BLOOD BY AUTOMATED COUNT: 12.9 % (ref 12.3–15.4)
FOLATE SERPL-MCNC: 13.4 NG/ML (ref 4.78–24.2)
GLOBULIN SER CALC-MCNC: 2.5 GM/DL
GLUCOSE SERPL-MCNC: 88 MG/DL (ref 65–99)
HBA1C MFR BLD: 5.5 % (ref 4.8–5.6)
HCT VFR BLD AUTO: 45.8 % (ref 34–46.6)
HDLC SERPL-MCNC: 50 MG/DL (ref 40–60)
HGB BLD-MCNC: 15.7 G/DL (ref 12–15.9)
IMM GRANULOCYTES # BLD AUTO: 0.02 10*3/MM3 (ref 0–0.05)
IMM GRANULOCYTES NFR BLD AUTO: 0.3 % (ref 0–0.5)
LDLC SERPL CALC-MCNC: 131 MG/DL (ref 0–100)
LYMPHOCYTES # BLD AUTO: 2.41 10*3/MM3 (ref 0.7–3.1)
LYMPHOCYTES NFR BLD AUTO: 31.2 % (ref 19.6–45.3)
MAGNESIUM SERPL-MCNC: 2.4 MG/DL (ref 1.6–2.6)
MCH RBC QN AUTO: 31.2 PG (ref 26.6–33)
MCHC RBC AUTO-ENTMCNC: 34.3 G/DL (ref 31.5–35.7)
MCV RBC AUTO: 90.9 FL (ref 79–97)
MONOCYTES # BLD AUTO: 0.57 10*3/MM3 (ref 0.1–0.9)
MONOCYTES NFR BLD AUTO: 7.4 % (ref 5–12)
NEUTROPHILS # BLD AUTO: 4.44 10*3/MM3 (ref 1.7–7)
NEUTROPHILS NFR BLD AUTO: 57.4 % (ref 42.7–76)
NRBC BLD AUTO-RTO: 0 /100 WBC (ref 0–0.2)
PLATELET # BLD AUTO: 225 10*3/MM3 (ref 140–450)
POTASSIUM SERPL-SCNC: 4.5 MMOL/L (ref 3.5–5.2)
PROT SERPL-MCNC: 7.2 G/DL (ref 6–8.5)
RBC # BLD AUTO: 5.04 10*6/MM3 (ref 3.77–5.28)
SODIUM SERPL-SCNC: 140 MMOL/L (ref 136–145)
T3FREE SERPL-MCNC: 2.9 PG/ML (ref 2–4.4)
T4 FREE SERPL-MCNC: 1.06 NG/DL (ref 0.93–1.7)
TRIGL SERPL-MCNC: 169 MG/DL (ref 0–150)
TSH SERPL DL<=0.005 MIU/L-ACNC: 2.61 UIU/ML (ref 0.27–4.2)
VIT B12 SERPL-MCNC: 409 PG/ML (ref 211–946)
VLDLC SERPL CALC-MCNC: 30 MG/DL (ref 5–40)
WBC # BLD AUTO: 7.72 10*3/MM3 (ref 3.4–10.8)

## 2023-12-01 ENCOUNTER — HOSPITAL ENCOUNTER (OUTPATIENT)
Facility: HOSPITAL | Age: 58
Discharge: HOME | End: 2023-12-01
Payer: COMMERCIAL

## 2023-12-01 ENCOUNTER — APPOINTMENT (OUTPATIENT)
Dept: OTHER | Facility: HOSPITAL | Age: 58
End: 2023-12-01
Payer: COMMERCIAL

## 2023-12-01 DIAGNOSIS — Z09 FOLLOW-UP EXAM: ICD-10-CM

## 2023-12-01 PROCEDURE — 76536 US EXAM OF HEAD AND NECK: CPT

## 2024-05-07 RX ORDER — LOSARTAN POTASSIUM 50 MG/1
50 TABLET ORAL DAILY
Qty: 90 TABLET | Refills: 0 | Status: SHIPPED | OUTPATIENT
Start: 2024-05-07

## 2024-08-01 NOTE — TELEPHONE ENCOUNTER
Rx Refill Note  Requested Prescriptions     Pending Prescriptions Disp Refills    losartan (COZAAR) 50 MG tablet [Pharmacy Med Name: Losartan Potassium 50 MG Oral Tablet] 90 tablet 0     Sig: Take 1 tablet by mouth once daily      Last office visit with prescribing clinician: 11/8/2023   Last telemedicine visit with prescribing clinician: Visit date not found   Next office visit with prescribing clinician: Visit date not found                         Would you like a call back once the refill request has been completed: [] Yes [] No    If the office needs to give you a call back, can they leave a voicemail: [] Yes [] No    Maribel Christina MA  08/01/24, 10:41 EDT

## 2024-08-02 RX ORDER — LOSARTAN POTASSIUM 50 MG/1
50 TABLET ORAL DAILY
Qty: 30 TABLET | Refills: 0 | Status: SHIPPED | OUTPATIENT
Start: 2024-08-02

## 2024-09-18 RX ORDER — LOSARTAN POTASSIUM 50 MG/1
50 TABLET ORAL DAILY
Qty: 30 TABLET | Refills: 0 | Status: SHIPPED | OUTPATIENT
Start: 2024-09-18

## 2024-10-14 ENCOUNTER — OFFICE VISIT (OUTPATIENT)
Dept: FAMILY MEDICINE CLINIC | Facility: CLINIC | Age: 59
End: 2024-10-14
Payer: COMMERCIAL

## 2024-10-14 VITALS
HEART RATE: 73 BPM | HEIGHT: 70 IN | RESPIRATION RATE: 16 BRPM | SYSTOLIC BLOOD PRESSURE: 140 MMHG | WEIGHT: 192 LBS | BODY MASS INDEX: 27.49 KG/M2 | OXYGEN SATURATION: 99 % | DIASTOLIC BLOOD PRESSURE: 90 MMHG | TEMPERATURE: 97.3 F

## 2024-10-14 DIAGNOSIS — I10 ESSENTIAL HYPERTENSION: Primary | ICD-10-CM

## 2024-10-14 DIAGNOSIS — L40.9 PSORIASIS: ICD-10-CM

## 2024-10-14 DIAGNOSIS — Z13.6 SCREENING FOR ISCHEMIC HEART DISEASE: ICD-10-CM

## 2024-10-14 DIAGNOSIS — E78.2 HYPERLIPIDEMIA, MIXED: ICD-10-CM

## 2024-10-14 DIAGNOSIS — E55.9 VITAMIN D DEFICIENCY: ICD-10-CM

## 2024-10-14 PROCEDURE — 99214 OFFICE O/P EST MOD 30 MIN: CPT | Performed by: PHYSICIAN ASSISTANT

## 2024-10-14 RX ORDER — LOSARTAN POTASSIUM 100 MG/1
100 TABLET ORAL DAILY
Qty: 90 TABLET | Refills: 3 | Status: SHIPPED | OUTPATIENT
Start: 2024-10-14

## 2024-10-14 NOTE — PROGRESS NOTES
"Virginia Pérez is a 59 y.o. female.     History of Present Illness    Since the last visit, she has overall felt well.  She has Primary Hypertension not at goal, plan to add/adjust medication, Hyperlipidemia and working on this with diet and exercise, and Vitamin D deficiency and will update labs for continued management.  she has been compliant with current medications have reviewed them.  The patient denies medication side effects.  Will refill medications. /86   Pulse 73   Temp 97.3 °F (36.3 °C) (Temporal)   Resp 16   Ht 177.8 cm (70\")   Wt 87.1 kg (192 lb)   SpO2 99%   BMI 27.55 kg/m² .        Taking H2 blocker for GERD  Some exercise    Results for orders placed or performed in visit on 11/08/23   Comprehensive metabolic panel    Collection Time: 11/08/23  9:22 AM    Specimen: Blood   Result Value Ref Range    Glucose 88 65 - 99 mg/dL    BUN 13 6 - 20 mg/dL    Creatinine 0.79 0.57 - 1.00 mg/dL    EGFR Result 87.4 >60.0 mL/min/1.73    BUN/Creatinine Ratio 16.5 7.0 - 25.0    Sodium 140 136 - 145 mmol/L    Potassium 4.5 3.5 - 5.2 mmol/L    Chloride 102 98 - 107 mmol/L    Total CO2 30.4 (H) 22.0 - 29.0 mmol/L    Calcium 10.1 8.6 - 10.5 mg/dL    Total Protein 7.2 6.0 - 8.5 g/dL    Albumin 4.7 3.5 - 5.2 g/dL    Globulin 2.5 gm/dL    A/G Ratio 1.9 g/dL    Total Bilirubin 0.8 0.0 - 1.2 mg/dL    Alkaline Phosphatase 72 39 - 117 U/L    AST (SGOT) 44 (H) 1 - 32 U/L    ALT (SGPT) 62 (H) 1 - 33 U/L   Lipid panel    Collection Time: 11/08/23  9:22 AM    Specimen: Blood   Result Value Ref Range    Total Cholesterol 211 (H) 0 - 200 mg/dL    Triglycerides 169 (H) 0 - 150 mg/dL    HDL Cholesterol 50 40 - 60 mg/dL    VLDL Cholesterol Juan 30 5 - 40 mg/dL    LDL Chol Calc (NIH) 131 (H) 0 - 100 mg/dL   TSH    Collection Time: 11/08/23  9:22 AM    Specimen: Blood   Result Value Ref Range    TSH 2.610 0.270 - 4.200 uIU/mL   Hemoglobin A1c    Collection Time: 11/08/23  9:22 AM    Specimen: Blood   Result " Value Ref Range    Hemoglobin A1C 5.50 4.80 - 5.60 %   Magnesium    Collection Time: 11/08/23  9:22 AM    Specimen: Blood   Result Value Ref Range    Magnesium 2.4 1.6 - 2.6 mg/dL   T3, Free    Collection Time: 11/08/23  9:22 AM    Specimen: Blood   Result Value Ref Range    T3, Free 2.9 2.0 - 4.4 pg/mL   T4, Free    Collection Time: 11/08/23  9:22 AM    Specimen: Blood   Result Value Ref Range    Free T4 1.06 0.93 - 1.70 ng/dL   Vitamin B12    Collection Time: 11/08/23  9:22 AM    Specimen: Blood   Result Value Ref Range    Vitamin B-12 409 211 - 946 pg/mL   Folate    Collection Time: 11/08/23  9:22 AM    Specimen: Blood   Result Value Ref Range    Folate 13.40 4.78 - 24.20 ng/mL   Vitamin D,25-Hydroxy    Collection Time: 11/08/23  9:22 AM    Specimen: Blood   Result Value Ref Range    25 Hydroxy, Vitamin D 51.4 30.0 - 100.0 ng/ml   CBC and Differential    Collection Time: 11/08/23  9:22 AM    Specimen: Blood   Result Value Ref Range    WBC 7.72 3.40 - 10.80 10*3/mm3    RBC 5.04 3.77 - 5.28 10*6/mm3    Hemoglobin 15.7 12.0 - 15.9 g/dL    Hematocrit 45.8 34.0 - 46.6 %    MCV 90.9 79.0 - 97.0 fL    MCH 31.2 26.6 - 33.0 pg    MCHC 34.3 31.5 - 35.7 g/dL    RDW 12.9 12.3 - 15.4 %    Platelets 225 140 - 450 10*3/mm3    Neutrophil Rel % 57.4 42.7 - 76.0 %    Lymphocyte Rel % 31.2 19.6 - 45.3 %    Monocyte Rel % 7.4 5.0 - 12.0 %    Eosinophil Rel % 2.5 0.3 - 6.2 %    Basophil Rel % 1.2 0.0 - 1.5 %    Neutrophils Absolute 4.44 1.70 - 7.00 10*3/mm3    Lymphocytes Absolute 2.41 0.70 - 3.10 10*3/mm3    Monocytes Absolute 0.57 0.10 - 0.90 10*3/mm3    Eosinophils Absolute 0.19 0.00 - 0.40 10*3/mm3    Basophils Absolute 0.09 0.00 - 0.20 10*3/mm3    Immature Granulocyte Rel % 0.3 0.0 - 0.5 %    Immature Grans Absolute 0.02 0.00 - 0.05 10*3/mm3    nRBC 0.0 0.0 - 0.2 /100 WBC     No headaches at home blood pressures less than 130/90    Me on 11/8/2023.  Also noting her history of psoriasis and remains on Humira for treatment.  Has  seen Dr. Buckner rheumatologist in the past as well.  History of parathyroidectomy x 1 per Dr. Camejo.  GYN orders mammogram and DEXA  Exercises most days on the elliptical.  Mom has history of breast cancer.    Had her update thyroid ultrasound with prior knowledge of right thyroid nodule performed on 12/1/2023 and compared to prior ultrasound from 2015 and radiologist noted no change from 10/27/2015 and no further follow-up is necessary.    Negative liver ultrasound on 1/20/2022    Normal EKG on 9/13/2018  The following portions of the patient's history were reviewed and updated as appropriate: allergies, current medications, past family history, past medical history, past social history, past surgical history, and problem list.    Review of Systems    Objective   Physical Exam      Assessment & Plan   Diagnoses and all orders for this visit:    1. Essential hypertension (Primary)  -     Comprehensive metabolic panel  -     Lipid panel  -     CBC and Differential  -     TSH  -     Vitamin D,25-Hydroxy  -     Vitamin B12  -     Folate  -     Urinalysis With Microscopic - Urine, Clean Catch  -     T4, Free    2. Hyperlipidemia, mixed  -     Comprehensive metabolic panel  -     Lipid panel  -     CBC and Differential  -     TSH  -     Vitamin D,25-Hydroxy  -     Vitamin B12  -     Folate  -     Urinalysis With Microscopic - Urine, Clean Catch  -     T4, Free    3. Vitamin D deficiency  -     Comprehensive metabolic panel  -     Lipid panel  -     CBC and Differential  -     TSH  -     Vitamin D,25-Hydroxy  -     Vitamin B12  -     Folate  -     Urinalysis With Microscopic - Urine, Clean Catch  -     T4, Free    4. Psoriasis  -     Comprehensive metabolic panel  -     Lipid panel  -     CBC and Differential  -     TSH  -     Vitamin D,25-Hydroxy  -     Vitamin B12  -     Folate  -     Urinalysis With Microscopic - Urine, Clean Catch  -     T4, Free    5. Screening for ischemic heart disease  -     CT Cardiac Calcium  Score Without Dye; Future        Plan, Adriana Henderson, was seen today.  she was seen for HTN and add/adjust medication, Hyperlipidemia and will work on this with diet and exercise, and Vitamin D deficiency and will update labs .    Sees Dr Foreman for derm and remains on Humira for her psoriasis treatment and is working       Answers submitted by the patient for this visit:  Primary Reason for Visit (Submitted on 10/8/2024)  What is the primary reason for your visit?: High Blood Pressure

## 2024-10-25 DIAGNOSIS — R79.89 LFT ELEVATION: Primary | ICD-10-CM

## 2024-10-26 LAB
25(OH)D3+25(OH)D2 SERPL-MCNC: 59.1 NG/ML (ref 30–100)
ALBUMIN SERPL-MCNC: 4.1 G/DL (ref 3.5–5.2)
ALBUMIN/GLOB SERPL: 1.4 G/DL
ALP SERPL-CCNC: 80 U/L (ref 39–117)
ALT SERPL-CCNC: 51 U/L (ref 1–33)
APPEARANCE UR: CLEAR
AST SERPL-CCNC: 36 U/L (ref 1–32)
BACTERIA #/AREA URNS HPF: ABNORMAL /HPF
BASOPHILS # BLD AUTO: 0.07 10*3/MM3 (ref 0–0.2)
BASOPHILS NFR BLD AUTO: 1.1 % (ref 0–1.5)
BILIRUB SERPL-MCNC: 0.7 MG/DL (ref 0–1.2)
BILIRUB UR QL STRIP: NEGATIVE
BUN SERPL-MCNC: 12 MG/DL (ref 6–20)
BUN/CREAT SERPL: 15.4 (ref 7–25)
CALCIUM SERPL-MCNC: 9.6 MG/DL (ref 8.6–10.5)
CASTS URNS MICRO: ABNORMAL
CHLORIDE SERPL-SCNC: 105 MMOL/L (ref 98–107)
CHOLEST SERPL-MCNC: 176 MG/DL (ref 0–200)
CO2 SERPL-SCNC: 26.4 MMOL/L (ref 22–29)
COLOR UR: YELLOW
CREAT SERPL-MCNC: 0.78 MG/DL (ref 0.57–1)
EGFRCR SERPLBLD CKD-EPI 2021: 88.2 ML/MIN/1.73
EOSINOPHIL # BLD AUTO: 0.23 10*3/MM3 (ref 0–0.4)
EOSINOPHIL NFR BLD AUTO: 3.5 % (ref 0.3–6.2)
EPI CELLS #/AREA URNS HPF: ABNORMAL /HPF
ERYTHROCYTE [DISTWIDTH] IN BLOOD BY AUTOMATED COUNT: 13 % (ref 12.3–15.4)
FOLATE SERPL-MCNC: 11.1 NG/ML (ref 4.78–24.2)
GLOBULIN SER CALC-MCNC: 2.9 GM/DL
GLUCOSE SERPL-MCNC: 81 MG/DL (ref 65–99)
GLUCOSE UR QL STRIP: NEGATIVE
HCT VFR BLD AUTO: 42.4 % (ref 34–46.6)
HDLC SERPL-MCNC: 43 MG/DL (ref 40–60)
HGB BLD-MCNC: 14.5 G/DL (ref 12–15.9)
HGB UR QL STRIP: NEGATIVE
IMM GRANULOCYTES # BLD AUTO: 0.01 10*3/MM3 (ref 0–0.05)
IMM GRANULOCYTES NFR BLD AUTO: 0.2 % (ref 0–0.5)
KETONES UR QL STRIP: NEGATIVE
LDLC SERPL CALC-MCNC: 106 MG/DL (ref 0–100)
LEUKOCYTE ESTERASE UR QL STRIP: ABNORMAL
LYMPHOCYTES # BLD AUTO: 2.41 10*3/MM3 (ref 0.7–3.1)
LYMPHOCYTES NFR BLD AUTO: 36.2 % (ref 19.6–45.3)
MCH RBC QN AUTO: 32.5 PG (ref 26.6–33)
MCHC RBC AUTO-ENTMCNC: 34.2 G/DL (ref 31.5–35.7)
MCV RBC AUTO: 95.1 FL (ref 79–97)
MONOCYTES # BLD AUTO: 0.5 10*3/MM3 (ref 0.1–0.9)
MONOCYTES NFR BLD AUTO: 7.5 % (ref 5–12)
NEUTROPHILS # BLD AUTO: 3.44 10*3/MM3 (ref 1.7–7)
NEUTROPHILS NFR BLD AUTO: 51.5 % (ref 42.7–76)
NITRITE UR QL STRIP: NEGATIVE
NRBC BLD AUTO-RTO: 0 /100 WBC (ref 0–0.2)
PH UR STRIP: 6 [PH] (ref 5–8)
PLATELET # BLD AUTO: 230 10*3/MM3 (ref 140–450)
POTASSIUM SERPL-SCNC: 3.8 MMOL/L (ref 3.5–5.2)
PROT SERPL-MCNC: 7 G/DL (ref 6–8.5)
PROT UR QL STRIP: NEGATIVE
RBC # BLD AUTO: 4.46 10*6/MM3 (ref 3.77–5.28)
RBC #/AREA URNS HPF: ABNORMAL /HPF
SODIUM SERPL-SCNC: 139 MMOL/L (ref 136–145)
SP GR UR STRIP: 1.02 (ref 1–1.03)
T4 FREE SERPL-MCNC: 1.12 NG/DL (ref 0.92–1.68)
TRIGL SERPL-MCNC: 156 MG/DL (ref 0–150)
TSH SERPL DL<=0.005 MIU/L-ACNC: 2.28 UIU/ML (ref 0.27–4.2)
UROBILINOGEN UR STRIP-MCNC: ABNORMAL MG/DL
VIT B12 SERPL-MCNC: 390 PG/ML (ref 211–946)
VLDLC SERPL CALC-MCNC: 27 MG/DL (ref 5–40)
WBC # BLD AUTO: 6.66 10*3/MM3 (ref 3.4–10.8)
WBC #/AREA URNS HPF: ABNORMAL /HPF

## 2024-10-28 PROBLEM — R79.89 LFT ELEVATION: Status: ACTIVE | Noted: 2024-10-28

## 2024-11-08 ENCOUNTER — HOSPITAL ENCOUNTER (OUTPATIENT)
Facility: HOSPITAL | Age: 59
Discharge: HOME | End: 2024-11-08
Payer: COMMERCIAL

## 2024-11-15 ENCOUNTER — HOSPITAL ENCOUNTER (OUTPATIENT)
Facility: HOSPITAL | Age: 59
Discharge: HOME | End: 2024-11-15
Admitting: PHYSICIAN ASSISTANT
Payer: COMMERCIAL

## 2024-11-15 PROCEDURE — 76705 ECHO EXAM OF ABDOMEN: CPT

## 2024-11-20 RX ORDER — FAMOTIDINE 20 MG/1
TABLET, FILM COATED ORAL
Qty: 180 TABLET | Refills: 3 | Status: SHIPPED | OUTPATIENT
Start: 2024-11-20

## 2025-01-13 ENCOUNTER — OFFICE VISIT (OUTPATIENT)
Dept: GASTROENTEROLOGY | Facility: CLINIC | Age: 60
End: 2025-01-13
Payer: COMMERCIAL

## 2025-01-13 VITALS
DIASTOLIC BLOOD PRESSURE: 94 MMHG | HEART RATE: 80 BPM | BODY MASS INDEX: 28 KG/M2 | SYSTOLIC BLOOD PRESSURE: 166 MMHG | WEIGHT: 195.6 LBS | HEIGHT: 70 IN

## 2025-01-13 DIAGNOSIS — R79.89 LFT ELEVATION: Primary | ICD-10-CM

## 2025-01-13 DIAGNOSIS — K76.0 FATTY LIVER: ICD-10-CM

## 2025-01-13 PROCEDURE — 99204 OFFICE O/P NEW MOD 45 MIN: CPT

## 2025-01-13 RX ORDER — ADALIMUMAB 40MG/0.4ML
KIT SUBCUTANEOUS
COMMUNITY
Start: 2025-01-08

## 2025-01-13 RX ORDER — LANOLIN ALCOHOL/MO/W.PET/CERES
1000 CREAM (GRAM) TOPICAL DAILY
COMMUNITY

## 2025-01-13 NOTE — Clinical Note
59-year-old female with history of mildly elevated LFTs dating back at least till 2017 w/ most recent labs 10/25/23 showing AST 36, ALT 51, T bili and alk phos normal, normal platelets and  US 11/2024 showing fatty liver w/ findings worrisome for possible cirrhosis. No family hx of liver dz, personal history of alcohol use. Serology in 2017 was normal. Recent lipid panel w/ mildly elevated triglycerides/cholesterol/LDL. I was going to order a dedicated liver MRI to help differentiate fatty liver v cirrhosis. She states she has significant claustrophobia - would you be comfortable sending her some valium to take prior to the exam? She says she will have a  lined up. Thank you

## 2025-01-13 NOTE — PROGRESS NOTES
Chief Complaint  Elevated LFTs    Subjective          History of Present Illness    Adriana Pérez is a  59 y.o. female presents for new over 3-year office visit.  She is a patient of Dr. Leal and has been seen in the past for elevated LFTs.    Patient reports she was referred back to our office after having a liver ultrasound completed back in November which showed diffusely increased hepatic echotexture consistent with fatty filtration with some findings worrisome for cirrhosis.  She denies any alcohol use.  She reports that she has had a rough past 2 years and has not been able to exercise as consistently as she has in the past.  She reports that she has gained some weight but reports that she still fits in the same close and did not feel that it was significant.  Recent lipid panel completed 10/25/2024 showed cholesterol 176, triglycerides 156.  She is not on any sort of cholesterol medication.    She was last in the office 3/22/2017 for elevated LFTs.  At that time liver serology was completed including acute hepatitis panel, ceruloplasmin, iron profile, ferritin, TAI, anti-smooth muscle antibody, hepatitis A, antimitochondrial antibody, alpha-1 antitrypsin, IgG and all were unremarkable.    Liver ultrasound completed in 2017 showed normal liver.      Liver ultrasound completed 11/15/2024 showed diffusely increased hepatic echotexture consistent with fatty infiltration with some coarsening of overall echotexture with irregularity of the liver surface worrisome for cirrhosis.    Most recent labs show platelet 230.  CMP completed 10/25/2024 showed mildly elevated AST and ALT with AST 36, ALT 51, T. bili 0.7, alk phos 80.    5/16/2017 colonoscopy showed to 2 to 3 mm polyps in the rectum, nonbleeding internal hemorrhoids and otherwise normal exam.  Repeat colonoscopy 10 years.      Objective   Vital Signs:   /94 (BP Location: Right arm, Patient Position: Sitting, Cuff Size: Adult)   Pulse 80   Ht 177.8  "cm (70\")   Wt 88.7 kg (195 lb 9.6 oz)   BMI 28.07 kg/m²       Physical Exam  Constitutional:       General: She is not in acute distress.     Appearance: Normal appearance.   Eyes:      General: No scleral icterus.  Pulmonary:      Effort: Pulmonary effort is normal.   Abdominal:      General: Abdomen is flat. Bowel sounds are normal. There is no distension.      Tenderness: There is no abdominal tenderness. There is no guarding.   Skin:     Coloration: Skin is not jaundiced.   Neurological:      General: No focal deficit present.      Mental Status: She is alert and oriented to person, place, and time.   Psychiatric:         Mood and Affect: Mood normal.         Behavior: Behavior normal.          Result Review :   The following data was reviewed by: Ana Null PA-C on 01/13/2025:  CMP          10/25/2024    12:43   CMP   Glucose 81    BUN 12    Creatinine 0.78    Sodium 139    Potassium 3.8    Chloride 105    Calcium 9.6    Total Protein 7.0    Albumin 4.1    Globulin 2.9    Total Bilirubin 0.7    Alkaline Phosphatase 80    AST (SGOT) 36    ALT (SGPT) 51    BUN/Creatinine Ratio 15.4      CBC          10/25/2024    12:43   CBC   WBC 6.66    RBC 4.46    Hemoglobin 14.5    Hematocrit 42.4    MCV 95.1    MCH 32.5    MCHC 34.2    RDW 13.0    Platelets 230              Assessment:   Diagnoses and all orders for this visit:    1. LFT elevation (Primary)  -     MRI Abdomen With & Without Contrast; Future    2. Fatty liver  -     MRI Abdomen With & Without Contrast; Future          Plan:   -LFTs have been mildly elevated dating back several years.  She does have elevated cholesterol but this is minimally elevated although she has not ever been on medication for this.  Ultrasound did show fatty liver versus cirrhosis.  Recommend MRI liver protocol for further evaluation to help distinguish between these.  May need to consider elastography in the future.  -Will order further bloodwork based off of findings of " MRI  -Recommended healthy diet, daily exercise, 2-3 cups black coffee daily as well.       Follow Up   No follow-ups on file.    Dragon dictation used throughout this note.         Ana Null PA-C  [facility]  [address] [place] [address]  Office: [phone]

## 2025-01-17 DIAGNOSIS — F40.240 CLAUSTROPHOBIA: Primary | ICD-10-CM

## 2025-01-17 RX ORDER — LORAZEPAM 1 MG/1
TABLET ORAL
Qty: 2 TABLET | Refills: 0 | Status: SHIPPED | OUTPATIENT
Start: 2025-01-17

## 2025-01-21 ENCOUNTER — APPOINTMENT (OUTPATIENT)
Dept: WOMENS IMAGING | Facility: HOSPITAL | Age: 60
End: 2025-01-21
Payer: COMMERCIAL

## 2025-01-21 PROCEDURE — 77065 DX MAMMO INCL CAD UNI: CPT | Performed by: RADIOLOGY

## 2025-01-21 PROCEDURE — 76642 ULTRASOUND BREAST LIMITED: CPT | Performed by: RADIOLOGY

## 2025-01-21 PROCEDURE — G0279 TOMOSYNTHESIS, MAMMO: HCPCS | Performed by: RADIOLOGY

## 2025-01-21 PROCEDURE — 77061 BREAST TOMOSYNTHESIS UNI: CPT | Performed by: RADIOLOGY

## 2025-02-21 ENCOUNTER — HOSPITAL ENCOUNTER (OUTPATIENT)
Dept: MRI IMAGING | Facility: HOSPITAL | Age: 60
Discharge: HOME | End: 2025-02-21
Payer: COMMERCIAL

## 2025-02-21 DIAGNOSIS — K76.0 FATTY LIVER: ICD-10-CM

## 2025-02-21 DIAGNOSIS — R79.89 LFT ELEVATION: ICD-10-CM

## 2025-02-21 PROCEDURE — 25510000002 GADOBENATE DIMEGLUMINE 529 MG/ML SOLUTION

## 2025-02-21 PROCEDURE — 74183 MRI ABD W/O CNTR FLWD CNTR: CPT

## 2025-02-21 PROCEDURE — A9577 INJ MULTIHANCE: HCPCS

## 2025-02-21 RX ADMIN — GADOBENATE DIMEGLUMINE 19 ML: 529 INJECTION, SOLUTION INTRAVENOUS at 11:14

## 2025-02-26 DIAGNOSIS — R79.89 LFT ELEVATION: Primary | ICD-10-CM

## 2025-03-17 ENCOUNTER — PATIENT MESSAGE (OUTPATIENT)
Dept: FAMILY MEDICINE CLINIC | Facility: CLINIC | Age: 60
End: 2025-03-17
Payer: COMMERCIAL

## 2025-05-27 ENCOUNTER — PATIENT MESSAGE (OUTPATIENT)
Dept: FAMILY MEDICINE CLINIC | Facility: CLINIC | Age: 60
End: 2025-05-27
Payer: COMMERCIAL

## 2025-05-29 RX ORDER — AMLODIPINE BESYLATE 2.5 MG/1
2.5 TABLET ORAL DAILY
Qty: 90 TABLET | Refills: 0 | Status: SHIPPED | OUTPATIENT
Start: 2025-05-29

## 2025-07-09 ENCOUNTER — OFFICE VISIT (OUTPATIENT)
Dept: FAMILY MEDICINE CLINIC | Facility: CLINIC | Age: 60
End: 2025-07-09
Payer: COMMERCIAL

## 2025-07-09 VITALS
RESPIRATION RATE: 16 BRPM | OXYGEN SATURATION: 98 % | WEIGHT: 194 LBS | TEMPERATURE: 97.9 F | BODY MASS INDEX: 27.77 KG/M2 | HEART RATE: 73 BPM | DIASTOLIC BLOOD PRESSURE: 88 MMHG | SYSTOLIC BLOOD PRESSURE: 118 MMHG | HEIGHT: 70 IN

## 2025-07-09 DIAGNOSIS — L40.9 PSORIASIS: ICD-10-CM

## 2025-07-09 DIAGNOSIS — Z90.89 STATUS POST PARATHYROIDECTOMY: ICD-10-CM

## 2025-07-09 DIAGNOSIS — K21.9 GASTROESOPHAGEAL REFLUX DISEASE WITHOUT ESOPHAGITIS: ICD-10-CM

## 2025-07-09 DIAGNOSIS — I10 ESSENTIAL HYPERTENSION: Primary | ICD-10-CM

## 2025-07-09 DIAGNOSIS — Z98.890 STATUS POST PARATHYROIDECTOMY: ICD-10-CM

## 2025-07-09 DIAGNOSIS — E78.2 HYPERLIPIDEMIA, MIXED: ICD-10-CM

## 2025-07-09 DIAGNOSIS — E55.9 VITAMIN D DEFICIENCY: ICD-10-CM

## 2025-07-09 DIAGNOSIS — E04.1 THYROID NODULE: ICD-10-CM

## 2025-07-09 PROCEDURE — 99214 OFFICE O/P EST MOD 30 MIN: CPT | Performed by: PHYSICIAN ASSISTANT

## 2025-07-09 RX ORDER — AMLODIPINE BESYLATE 2.5 MG/1
2.5 TABLET ORAL DAILY
Qty: 90 TABLET | Refills: 1 | Status: SHIPPED | OUTPATIENT
Start: 2025-07-09

## 2025-07-09 RX ORDER — LOSARTAN POTASSIUM 100 MG/1
100 TABLET ORAL DAILY
Qty: 90 TABLET | Refills: 3 | Status: SHIPPED | OUTPATIENT
Start: 2025-07-09

## 2025-07-09 RX ORDER — FAMOTIDINE 20 MG/1
20 TABLET, FILM COATED ORAL 2 TIMES DAILY
Qty: 180 TABLET | Refills: 3 | Status: SHIPPED | OUTPATIENT
Start: 2025-07-09

## 2025-07-09 NOTE — PROGRESS NOTES
"Subjective   Adriana Pérez is a 60 y.o. female.     Hypertension  Associated symptoms: no blurred vision, no chest pain, no palpitations and no shortness of breath        Since the last visit, she has overall felt well.  She has Primary Hypertension and well controlled on current medication, GERD controlled on H2 blocker, Hyperlipidemia and working on this with diet and exercise, and Vitamin D deficiency and will update labs for continued management.  she has been compliant with current medications have reviewed them.  The patient denies medication side effects.  Will refill medications. /88   Pulse 73   Temp 97.9 °F (36.6 °C) (Oral)   Resp 16   Ht 177.8 cm (70\")   Wt 88 kg (194 lb)   SpO2 98%   BMI 27.84 kg/m² .  BMI is >= 25 and <30. (Overweight) The following options were offered after discussion;: weight loss educational material (shared in after visit summary), exercise counseling/recommendations, and nutrition counseling/recommendations    Did raise dose Losartan last visit  BP staying up --she messaged me and I added Amlodipine 2.5mg  Bp back down <130/80      Results for orders placed or performed in visit on 10/25/24   Comprehensive Metabolic Panel    Collection Time: 10/25/24 12:43 PM   Result Value Ref Range    Glucose 81 65 - 99 mg/dL    BUN 12 6 - 20 mg/dL    Creatinine 0.78 0.57 - 1.00 mg/dL    EGFR Result 88.2 >60.0 mL/min/1.73    BUN/Creatinine Ratio 15.4 7.0 - 25.0    Sodium 139 136 - 145 mmol/L    Potassium 3.8 3.5 - 5.2 mmol/L    Chloride 105 98 - 107 mmol/L    Total CO2 26.4 22.0 - 29.0 mmol/L    Calcium 9.6 8.6 - 10.5 mg/dL    Total Protein 7.0 6.0 - 8.5 g/dL    Albumin 4.1 3.5 - 5.2 g/dL    Globulin 2.9 gm/dL    A/G Ratio 1.4 g/dL    Total Bilirubin 0.7 0.0 - 1.2 mg/dL    Alkaline Phosphatase 80 39 - 117 U/L    AST (SGOT) 36 (H) 1 - 32 U/L    ALT (SGPT) 51 (H) 1 - 33 U/L   Microscopic Examination -    Collection Time: 10/25/24 12:43 PM   Result Value Ref Range    WBC, UA 3-5 " (A) /HPF    RBC, UA 0-2 /HPF    Epithelial Cells (non renal) 7-12 (A) /HPF    Cast Type Comment     Bacteria, UA Trace (A) None Seen /HPF   Lipid Panel    Collection Time: 10/25/24 12:43 PM   Result Value Ref Range    Total Cholesterol 176 0 - 200 mg/dL    Triglycerides 156 (H) 0 - 150 mg/dL    HDL Cholesterol 43 40 - 60 mg/dL    VLDL Cholesterol Juan 27 5 - 40 mg/dL    LDL Chol Calc (NIH) 106 (H) 0 - 100 mg/dL   T4, Free    Collection Time: 10/25/24 12:43 PM   Result Value Ref Range    Free T4 1.12 0.92 - 1.68 ng/dL   Folate    Collection Time: 10/25/24 12:43 PM   Result Value Ref Range    Folate 11.10 4.78 - 24.20 ng/mL   TSH    Collection Time: 10/25/24 12:43 PM   Result Value Ref Range    TSH 2.280 0.270 - 4.200 uIU/mL   Vitamin D,25-Hydroxy    Collection Time: 10/25/24 12:43 PM   Result Value Ref Range    25 Hydroxy, Vitamin D 59.1 30.0 - 100.0 ng/mL   Vitamin B12    Collection Time: 10/25/24 12:43 PM   Result Value Ref Range    Vitamin B-12 390 211 - 946 pg/mL   CBC & Differential    Collection Time: 10/25/24 12:43 PM   Result Value Ref Range    WBC 6.66 3.40 - 10.80 10*3/mm3    RBC 4.46 3.77 - 5.28 10*6/mm3    Hemoglobin 14.5 12.0 - 15.9 g/dL    Hematocrit 42.4 34.0 - 46.6 %    MCV 95.1 79.0 - 97.0 fL    MCH 32.5 26.6 - 33.0 pg    MCHC 34.2 31.5 - 35.7 g/dL    RDW 13.0 12.3 - 15.4 %    Platelets 230 140 - 450 10*3/mm3    Neutrophil Rel % 51.5 42.7 - 76.0 %    Lymphocyte Rel % 36.2 19.6 - 45.3 %    Monocyte Rel % 7.5 5.0 - 12.0 %    Eosinophil Rel % 3.5 0.3 - 6.2 %    Basophil Rel % 1.1 0.0 - 1.5 %    Neutrophils Absolute 3.44 1.70 - 7.00 10*3/mm3    Lymphocytes Absolute 2.41 0.70 - 3.10 10*3/mm3    Monocytes Absolute 0.50 0.10 - 0.90 10*3/mm3    Eosinophils Absolute 0.23 0.00 - 0.40 10*3/mm3    Basophils Absolute 0.07 0.00 - 0.20 10*3/mm3    Immature Granulocyte Rel % 0.2 0.0 - 0.5 %    Immature Grans Absolute 0.01 0.00 - 0.05 10*3/mm3    nRBC 0.0 0.0 - 0.2 /100 WBC   Urinalysis With Microscopic -     Collection Time: 10/25/24 12:43 PM   Result Value Ref Range    Specific Gravity, UA 1.018 1.005 - 1.030    pH, UA 6.0 5.0 - 8.0    Color, UA Yellow     Appearance, UA Clear Clear    Leukocytes, UA See below: (A) Negative    Protein Negative Negative    Glucose, UA Negative Negative    Ketones Negative Negative    Blood, UA Negative Negative    Bilirubin, UA Negative Negative    Urobilinogen, UA Comment     Nitrite, UA Negative Negative     Also noting her history of psoriasis and remains on Humira for treatment.--Under Dr Foreman--derm    Has seen Dr. Buckner rheumatologist in the past as well.  History of parathyroidectomy x 1 per Dr. Camejo.  GYN orders mammogram and DEXA  Exercises most days on the elliptical.  Mom has history of breast cancer.  Had her update thyroid ultrasound with prior knowledge of right thyroid nodule performed on 12/1/2023 and compared to prior ultrasound from 2015 and radiologist noted no change from 10/27/2015 and no further follow-up is necessary.   Normal EKG on 9/13/2018   Neg pap 12-31-25---DR Vaughn  Saw Ana Null PA-C 1-13-25  I sent her for concern with elevated LFTs had liver ultrasound 11/15/2024 and had diffusely increased hepatic echotexture consistent with fatty infiltration  --She was last in the office 3/22/2017 for elevated LFTs. At that time liver serology was completed including acute hepatitis panel, ceruloplasmin, iron profile, ferritin, TAI, anti-smooth muscle antibody, hepatitis A, antimitochondrial antibody, alpha-1 antitrypsin, IgG and all were unremarkable. -saw DR Leal yrs ago for this  Ordered MRI of abdomen with and without contrast that was done on 2/21/2025 and  FINDINGS:  There is hepatic steatosis. The main hepatic and main portal veins are  patent. No suspicious enhancing lesion seen in the liver, pancreas or  adrenal glands. Subcentimeter splenic lesions likely of no clinical  consequence. Subcentimeter renal lesions are too small to  characterize.  Main pancreatic duct is not distended.     IMPRESSION  1.  Hepatic steatosis.  2.  Other findings as above     The following portions of the patient's history were reviewed and updated as appropriate: allergies, current medications, past family history, past medical history, past social history, past surgical history, and problem list.    Review of Systems   Eyes:  Negative for blurred vision.   Respiratory:  Negative for shortness of breath.    Cardiovascular:  Negative for chest pain and palpitations.       Objective   Physical Exam  Vitals and nursing note reviewed.   Constitutional:       General: She is not in acute distress.     Appearance: Normal appearance. She is well-developed. She is not ill-appearing or toxic-appearing.   HENT:      Head: Normocephalic.      Right Ear: External ear normal.      Left Ear: External ear normal.      Nose: Nose normal.      Mouth/Throat:      Pharynx: Oropharynx is clear.   Eyes:      General: No scleral icterus.     Conjunctiva/sclera: Conjunctivae normal.      Pupils: Pupils are equal, round, and reactive to light.   Neck:      Thyroid: No thyromegaly.   Cardiovascular:      Rate and Rhythm: Normal rate and regular rhythm.      Heart sounds: Normal heart sounds. No murmur heard.  Pulmonary:      Effort: Pulmonary effort is normal. No respiratory distress.      Breath sounds: Normal breath sounds. No rales.   Musculoskeletal:         General: No deformity. Normal range of motion.      Cervical back: Normal range of motion and neck supple.      Right lower leg: No edema.      Left lower leg: No edema.   Skin:     General: Skin is warm and dry.      Findings: No rash.   Neurological:      General: No focal deficit present.      Mental Status: She is alert and oriented to person, place, and time. Mental status is at baseline.   Psychiatric:         Mood and Affect: Mood normal.         Behavior: Behavior normal.         Thought Content: Thought content normal.          Judgment: Judgment normal.           Assessment & Plan   Diagnoses and all orders for this visit:    1. Essential hypertension (Primary)    2. Hyperlipidemia, mixed    3. Vitamin D deficiency    4. Status post parathyroidectomy    5. Psoriasis    6. Thyroid nodule    7. Gastroesophageal reflux disease without esophagitis      Continues on Humira to treat her psoriasis through dermatologist Dr. Foreman  Now seeing [facility] for  Due for GI  Will update labs  Taking B12   [facility] does mammo  F/u ---added amlodipine for hypertension were following up on blood pressure today  We will follow-up with GI for the fatty liver and I will incorporate the CMP lab with my labs  Appt GI  Plan, Adriana [place], was seen today.  she was seen for HTN and continue medication, GERD and will continue on H2 blocker, Hyperlipidemia and will work on this with diet and exercise, and Vitamin D deficiency and will update labs .

## 2025-07-10 ENCOUNTER — RESULTS FOLLOW-UP (OUTPATIENT)
Dept: FAMILY MEDICINE CLINIC | Facility: CLINIC | Age: 60
End: 2025-07-10
Payer: COMMERCIAL

## 2025-07-10 DIAGNOSIS — R94.6 NONSPECIFIC ABNORMAL RESULTS OF FUNCTION STUDY OF THYROID: Primary | ICD-10-CM

## 2025-07-10 LAB
25(OH)D3+25(OH)D2 SERPL-MCNC: 71.6 NG/ML (ref 30–100)
ALBUMIN SERPL-MCNC: 4.5 G/DL (ref 3.5–5.2)
ALBUMIN/GLOB SERPL: 1.7 G/DL
ALP SERPL-CCNC: 75 U/L (ref 39–117)
ALT SERPL-CCNC: 43 U/L (ref 1–33)
APPEARANCE UR: ABNORMAL
AST SERPL-CCNC: 34 U/L (ref 1–32)
BACTERIA #/AREA URNS HPF: ABNORMAL /HPF
BASOPHILS # BLD AUTO: 0.07 10*3/MM3 (ref 0–0.2)
BASOPHILS NFR BLD AUTO: 1 % (ref 0–1.5)
BILIRUB SERPL-MCNC: 0.8 MG/DL (ref 0–1.2)
BILIRUB UR QL STRIP: NEGATIVE
BUN SERPL-MCNC: 10 MG/DL (ref 6–20)
BUN/CREAT SERPL: 12 (ref 7–25)
CALCIUM SERPL-MCNC: 9.8 MG/DL (ref 8.6–10.5)
CASTS URNS MICRO: ABNORMAL
CHLORIDE SERPL-SCNC: 102 MMOL/L (ref 98–107)
CHOLEST SERPL-MCNC: 198 MG/DL (ref 0–200)
CO2 SERPL-SCNC: 27 MMOL/L (ref 22–29)
COLOR UR: YELLOW
CREAT SERPL-MCNC: 0.83 MG/DL (ref 0.57–1)
EGFRCR SERPLBLD CKD-EPI 2021: 81.3 ML/MIN/1.73
EOSINOPHIL # BLD AUTO: 0.32 10*3/MM3 (ref 0–0.4)
EOSINOPHIL NFR BLD AUTO: 4.6 % (ref 0.3–6.2)
EPI CELLS #/AREA URNS HPF: ABNORMAL /HPF
ERYTHROCYTE [DISTWIDTH] IN BLOOD BY AUTOMATED COUNT: 12.8 % (ref 12.3–15.4)
FOLATE SERPL-MCNC: 12.9 NG/ML (ref 4.78–24.2)
GLOBULIN SER CALC-MCNC: 2.7 GM/DL
GLUCOSE SERPL-MCNC: 94 MG/DL (ref 65–99)
GLUCOSE UR QL STRIP: NEGATIVE
HBA1C MFR BLD: 5.6 % (ref 4.8–5.6)
HCT VFR BLD AUTO: 44.7 % (ref 34–46.6)
HDLC SERPL-MCNC: 48 MG/DL (ref 40–60)
HGB BLD-MCNC: 15.1 G/DL (ref 12–15.9)
HGB UR QL STRIP: NEGATIVE
IMM GRANULOCYTES # BLD AUTO: 0.02 10*3/MM3 (ref 0–0.05)
IMM GRANULOCYTES NFR BLD AUTO: 0.3 % (ref 0–0.5)
KETONES UR QL STRIP: NEGATIVE
LDLC SERPL CALC-MCNC: 119 MG/DL (ref 0–100)
LEUKOCYTE ESTERASE UR QL STRIP: ABNORMAL
LYMPHOCYTES # BLD AUTO: 2.34 10*3/MM3 (ref 0.7–3.1)
LYMPHOCYTES NFR BLD AUTO: 33.3 % (ref 19.6–45.3)
MCH RBC QN AUTO: 31.5 PG (ref 26.6–33)
MCHC RBC AUTO-ENTMCNC: 33.8 G/DL (ref 31.5–35.7)
MCV RBC AUTO: 93.1 FL (ref 79–97)
MONOCYTES # BLD AUTO: 0.48 10*3/MM3 (ref 0.1–0.9)
MONOCYTES NFR BLD AUTO: 6.8 % (ref 5–12)
NEUTROPHILS # BLD AUTO: 3.8 10*3/MM3 (ref 1.7–7)
NEUTROPHILS NFR BLD AUTO: 54 % (ref 42.7–76)
NITRITE UR QL STRIP: NEGATIVE
NRBC BLD AUTO-RTO: 0 /100 WBC (ref 0–0.2)
PH UR STRIP: 6 [PH] (ref 5–8)
PLATELET # BLD AUTO: 243 10*3/MM3 (ref 140–450)
POTASSIUM SERPL-SCNC: 3.8 MMOL/L (ref 3.5–5.2)
PROT SERPL-MCNC: 7.2 G/DL (ref 6–8.5)
PROT UR QL STRIP: NEGATIVE
RBC # BLD AUTO: 4.8 10*6/MM3 (ref 3.77–5.28)
RBC #/AREA URNS HPF: ABNORMAL /HPF
SODIUM SERPL-SCNC: 140 MMOL/L (ref 136–145)
SP GR UR STRIP: 1.02 (ref 1–1.03)
T4 FREE SERPL-MCNC: 0.91 NG/DL (ref 0.92–1.68)
TRIGL SERPL-MCNC: 177 MG/DL (ref 0–150)
TSH SERPL DL<=0.005 MIU/L-ACNC: 2.05 UIU/ML (ref 0.27–4.2)
UROBILINOGEN UR STRIP-MCNC: ABNORMAL MG/DL
VIT B12 SERPL-MCNC: 1084 PG/ML (ref 211–946)
VLDLC SERPL CALC-MCNC: 31 MG/DL (ref 5–40)
WBC # BLD AUTO: 7.03 10*3/MM3 (ref 3.4–10.8)
WBC #/AREA URNS HPF: ABNORMAL /HPF

## 2025-07-10 NOTE — LETTER
Adriana Pérez  [address] [facility] [address]    July 10, 2025     Dear Ms. Pérez:    Below are the results from your recent visit:    Resulted Orders   Comprehensive metabolic panel   Result Value Ref Range    Glucose 94 65 - 99 mg/dL    BUN 10.0 6.0 - 20.0 mg/dL    Creatinine 0.83 0.57 - 1.00 mg/dL    EGFR Result 81.3 >60.0 mL/min/1.73      Comment:      GFR Categories in Chronic Kidney Disease (CKD)  GFR Category          GFR (mL/min/1.73)    Interpretation  G1                    90 or greater        Normal or high  (1)  G2                    60-89                Mild decrease  (1)  G3a                   45-59                Mild to moderate  decrease  G3b                   30-44                Moderate to  severe decrease  G4                    15-29                Severe decrease  G5                    14 or less           Kidney failure  (1)In the absence of evidence of kidney disease, neither  GFR category G1 or G2 fulfill the criteria for CKD.  eGFR calculation 2021 CKD-EPI creatinine equation, which  does not include race as a factor      BUN/Creatinine Ratio 12.0 7.0 - 25.0    Sodium 140 136 - 145 mmol/L    Potassium 3.8 3.5 - 5.2 mmol/L    Chloride 102 98 - 107 mmol/L    Total CO2 27.0 22.0 - 29.0 mmol/L    Calcium 9.8 8.6 - 10.5 mg/dL    Total Protein 7.2 6.0 - 8.5 g/dL    Albumin 4.5 3.5 - 5.2 g/dL    Globulin 2.7 gm/dL    A/G Ratio 1.7 g/dL    Total Bilirubin 0.8 0.0 - 1.2 mg/dL    Alkaline Phosphatase 75 39 - 117 U/L    AST (SGOT) 34 (H) 1 - 32 U/L    ALT (SGPT) 43 (H) 1 - 33 U/L   Lipid panel   Result Value Ref Range    Total Cholesterol 198 0 - 200 mg/dL      Comment:      Cholesterol Reference Ranges  (U.S. Department of Health and Human Services ATP III  Classifications)  Desirable          <200 mg/dL  Borderline High    200-239 mg/dL  High Risk          >240 mg/dL  Triglyceride Reference Ranges  (U.S. Department of Health and Human Services ATP III  Classifications)  Normal            <150 mg/dL  Borderline High  150-199 mg/dL  High             200-499 mg/dL  Very High        >500 mg/dL  HDL Reference Ranges  (U.S. Department of Health and Human Services ATP III  Classifications)  Low     <40 mg/dl (major risk factor for CHD)  High    >60 mg/dl ('negative' risk factor for CHD)  LDL Reference Ranges  (U.S. Department of Health and Human Services ATP III  Classifications)  Optimal          <100 mg/dL  Near Optimal     100-129 mg/dL  Borderline High  130-159 mg/dL  High             160-189 mg/dL  Very High        >189 mg/dL  LDL is calculated using the NIH LDL-C calculation.      Triglycerides 177 (H) 0 - 150 mg/dL    HDL Cholesterol 48 40 - 60 mg/dL    VLDL Cholesterol Juan 31 5 - 40 mg/dL    LDL Chol Calc ([facility]) 119 (H) 0 - 100 mg/dL   CBC and Differential   Result Value Ref Range    WBC 7.03 3.40 - 10.80 10*3/mm3    RBC 4.80 3.77 - 5.28 10*6/mm3    Hemoglobin 15.1 12.0 - 15.9 g/dL    Hematocrit 44.7 34.0 - 46.6 %    MCV 93.1 79.0 - 97.0 fL    MCH 31.5 26.6 - 33.0 pg    MCHC 33.8 31.5 - 35.7 g/dL    RDW 12.8 12.3 - 15.4 %    Platelets 243 140 - 450 10*3/mm3    Neutrophil Rel % 54.0 42.7 - 76.0 %    Lymphocyte Rel % 33.3 19.6 - 45.3 %    Monocyte Rel % 6.8 5.0 - 12.0 %    Eosinophil Rel % 4.6 0.3 - 6.2 %    Basophil Rel % 1.0 0.0 - 1.5 %    Neutrophils Absolute 3.80 1.70 - 7.00 10*3/mm3    Lymphocytes Absolute 2.34 0.70 - 3.10 10*3/mm3    Monocytes Absolute 0.48 0.10 - 0.90 10*3/mm3    Eosinophils Absolute 0.32 0.00 - 0.40 10*3/mm3    Basophils Absolute 0.07 0.00 - 0.20 10*3/mm3    Immature Granulocyte Rel % 0.3 0.0 - 0.5 %    Immature Grans Absolute 0.02 0.00 - 0.05 10*3/mm3    nRBC 0.0 0.0 - 0.2 /100 WBC   TSH   Result Value Ref Range    TSH 2.050 0.270 - 4.200 uIU/mL   Hemoglobin A1c   Result Value Ref Range    Hemoglobin A1C 5.60 4.80 - 5.60 %      Comment:      Hemoglobin A1C Ranges:  Increased Risk for Diabetes  5.7% to 6.4%  Diabetes                     >= 6.5%  Diabetic Goal                 < 7.0%     T4, Free   Result Value Ref Range    Free T4 0.91 (L) 0.92 - 1.68 ng/dL   Vitamin D,25-Hydroxy   Result Value Ref Range    25 Hydroxy, Vitamin D 71.6 30.0 - 100.0 ng/ml      Comment:      Reference Range for Total Vitamin D 25(OH)  Deficiency <20.0 ng/mL  Insufficiency 21-29 ng/mL  Sufficiency  ng/mL  Toxicity >100 ng/ml     Vitamin B12   Result Value Ref Range    Vitamin B-12 1,084 (H) 211 - 946 pg/mL      Comment:      Results may be falsely increased if patient taking Biotin.   Folate   Result Value Ref Range    Folate 12.90 4.78 - 24.20 ng/mL      Comment:      Results may be falsely increased if patient taking Biotin.   Urinalysis With Microscopic - Urine, Clean Catch   Result Value Ref Range    Specific Gravity, UA 1.019 1.005 - 1.030    pH, UA 6.0 5.0 - 8.0    Color, UA Yellow       Comment:      REFERENCE RANGE: Yellow, Straw    Appearance, UA Cloudy (A) Clear    Leukocytes, UA See below: (A) Negative      Comment:      Moderate (2+)    Protein Negative Negative    Glucose, UA Negative Negative    Ketones Negative Negative    Blood, UA Negative Negative    Bilirubin, UA Negative Negative    Urobilinogen, UA Comment       Comment:      0.2 E.U./dL  REFERENCE RANGE: 0.2 - 1.0 E.U./dL      Nitrite, UA Negative Negative   Microscopic Examination -   Result Value Ref Range    WBC, UA 11-20 (A) /HPF      Comment:      REFERENCE RANGE: None Seen, 0-2    RBC, UA 0-2 /HPF      Comment:      REFERENCE RANGE: None Seen, 0-2    Epithelial Cells (non renal) 21-30 (A) /HPF      Comment:      REFERENCE RANGE: None Seen, 0-2    Cast Type Comment       Comment:      HYALINE CASTS, UA            0-2              /LPF       None Seen  N    Bacteria, UA 4+ (A) None Seen /HPF       Vitamin levels all at goal.  Can reduce B12 to 3 times a week.  Vitamin D at goal.  Blood count in normal range.  Urine is negative for microscopic blood.  Cholesterol is less than 200 and your LDL is mildly elevated.  Your  cholesterol risk score is less than 7.5% and that is good.  Still consider calcium cardiac CT scan  Your TSH is in normal range but your free T4 is borderline low and do want to repeat your thyroid functions in 3 months nonfasting.  Liver enzymes are improved and we will copy GI on results.  Kidney functions in range.  Normal glucose     The 10-year ASCVD risk score (Ann-Marie LAGOS, et al., 2019) is: 3.7%    Values used to calculate the score:      Age: 60 years      Sex: Female      Is Non- : No      Diabetic: No      Tobacco smoker: No      Systolic Blood Pressure: 118 mmHg      Is BP treated: Yes      HDL Cholesterol: 48 mg/dL      Total Cholesterol: 198 mg/dL       If you have any questions or concerns, please don't hesitate to call.         Sincerely,        Lucinda Peralta PA-C

## 2025-07-17 DIAGNOSIS — R79.89 LFT ELEVATION: Primary | ICD-10-CM

## 2025-07-17 DIAGNOSIS — K76.0 FATTY LIVER: ICD-10-CM

## 2025-08-05 ENCOUNTER — HOSPITAL ENCOUNTER (OUTPATIENT)
Dept: ULTRASOUND IMAGING | Facility: HOSPITAL | Age: 60
Discharge: HOME | End: 2025-08-05
Payer: COMMERCIAL

## 2025-08-05 DIAGNOSIS — K76.0 FATTY LIVER: ICD-10-CM

## 2025-08-05 DIAGNOSIS — R79.89 LFT ELEVATION: ICD-10-CM

## 2025-08-05 PROCEDURE — 76705 ECHO EXAM OF ABDOMEN: CPT

## 2025-08-05 PROCEDURE — 0690T QUAN US TIS CHARAC W/DX US: CPT

## 2025-08-05 PROCEDURE — 76981 USE PARENCHYMA: CPT

## (undated) DEVICE — Device: Brand: DEFENDO AIR/WATER/SUCTION AND BIOPSY VALVE

## (undated) DEVICE — THE TORRENT IRRIGATION SCOPE CONNECTOR IS USED WITH THE TORRENT IRRIGATION TUBING TO PROVIDE IRRIGATION FLUIDS SUCH AS STERILE WATER DURING GASTROINTESTINAL ENDOSCOPIC PROCEDURES WHEN USED IN CONJUNCTION WITH AN IRRIGATION PUMP (OR ELECTROSURGICAL UNIT).: Brand: TORRENT

## (undated) DEVICE — TUBING, SUCTION, 1/4" X 10', STRAIGHT: Brand: MEDLINE

## (undated) DEVICE — CANNULA,ADULT,SOFT-TOUCH,7TUBE,SC: Brand: MEDLINE

## (undated) DEVICE — SINGLE-USE BIOPSY FORCEPS: Brand: RADIAL JAW 4